# Patient Record
Sex: MALE | Race: WHITE | NOT HISPANIC OR LATINO | Employment: FULL TIME | URBAN - METROPOLITAN AREA
[De-identification: names, ages, dates, MRNs, and addresses within clinical notes are randomized per-mention and may not be internally consistent; named-entity substitution may affect disease eponyms.]

---

## 2020-10-16 ENCOUNTER — OFFICE VISIT (OUTPATIENT)
Dept: FAMILY MEDICINE | Facility: CLINIC | Age: 45
End: 2020-10-16
Payer: COMMERCIAL

## 2020-10-16 VITALS
TEMPERATURE: 97 F | SYSTOLIC BLOOD PRESSURE: 148 MMHG | HEART RATE: 92 BPM | DIASTOLIC BLOOD PRESSURE: 100 MMHG | OXYGEN SATURATION: 97 % | BODY MASS INDEX: 39.17 KG/M2 | HEIGHT: 75 IN | WEIGHT: 315 LBS

## 2020-10-16 DIAGNOSIS — R53.83 FATIGUE, UNSPECIFIED TYPE: ICD-10-CM

## 2020-10-16 DIAGNOSIS — I10 ESSENTIAL HYPERTENSION: Primary | ICD-10-CM

## 2020-10-16 DIAGNOSIS — Z13.220 SCREENING, LIPID: ICD-10-CM

## 2020-10-16 DIAGNOSIS — Z11.4 SCREENING FOR HIV (HUMAN IMMUNODEFICIENCY VIRUS): ICD-10-CM

## 2020-10-16 DIAGNOSIS — K42.9 UMBILICAL HERNIA WITHOUT OBSTRUCTION AND WITHOUT GANGRENE: ICD-10-CM

## 2020-10-16 DIAGNOSIS — R79.89 LOW TESTOSTERONE: ICD-10-CM

## 2020-10-16 DIAGNOSIS — Z82.49 FAMILY HISTORY OF EARLY CAD: ICD-10-CM

## 2020-10-16 DIAGNOSIS — Z11.59 ENCOUNTER FOR HEPATITIS C SCREENING TEST FOR LOW RISK PATIENT: ICD-10-CM

## 2020-10-16 DIAGNOSIS — R60.9 DEPENDENT EDEMA: ICD-10-CM

## 2020-10-16 DIAGNOSIS — Z87.19 HISTORY OF UMBILICAL HERNIA: ICD-10-CM

## 2020-10-16 PROCEDURE — 3008F BODY MASS INDEX DOCD: CPT | Mod: S$GLB,,, | Performed by: NURSE PRACTITIONER

## 2020-10-16 PROCEDURE — 99204 OFFICE O/P NEW MOD 45 MIN: CPT | Mod: S$GLB,,, | Performed by: NURSE PRACTITIONER

## 2020-10-16 PROCEDURE — 3008F PR BODY MASS INDEX (BMI) DOCUMENTED: ICD-10-PCS | Mod: S$GLB,,, | Performed by: NURSE PRACTITIONER

## 2020-10-16 PROCEDURE — 99204 PR OFFICE/OUTPT VISIT, NEW, LEVL IV, 45-59 MIN: ICD-10-PCS | Mod: S$GLB,,, | Performed by: NURSE PRACTITIONER

## 2020-10-16 RX ORDER — LISINOPRIL AND HYDROCHLOROTHIAZIDE 10; 12.5 MG/1; MG/1
1 TABLET ORAL DAILY
Qty: 30 TABLET | Refills: 1 | Status: SHIPPED | OUTPATIENT
Start: 2020-10-16 | End: 2020-12-21 | Stop reason: SDUPTHER

## 2020-10-16 NOTE — PATIENT INSTRUCTIONS
Potassium-Rich Foods  The normal adult diet usually contains 2,000 mg to 4,000 mg of potassium per day. More potassium is needed when you lose too much potassium from your body. This can happen if you have diarrhea or vomiting. It can also happen if you take a medicine to make you urinate more (diuretic). To increase the amount of potassium in your diet, include these high-potassium foods.     [The (*) indicates foods highest in potassium.]  Vegetables  Artichokes. Cooked 1/2 cup, 200 mg to 300 mg*  Asparagus. Cooked 1/2 cup, 200 mg to 300 mg  Beans. White, red, almonte cooked 1/2 cup, 300 mg to 500 mg*  Beets. Cooked 1/2 cup, 200 mg to 300 mg  Broccoli. Cooked or raw 1 cup, 200 mg to 500 mg*  Almira sprouts. Cooked 1/2 cup, 200 mg to 300 mg  Cabbage. Raw 1 cup, 100 mg to 200 mg  Carrots. Raw or cooked 1/2 cup, 100 mg to 200 mg  Celery. Raw 1 cup, 200 mg to 300 mg  Lima beans. Fresh or frozen 1/2 cup, 300 mg to 500 mg*   Mushrooms. Raw or cooked 1/2 cup, 100 mg to 300 mg  Peas. Cooked 1/2 cup, 150 mg to 250 mg   Potatoes. Baked 1 medium, 500 mg to 900 mg*   Spinach. Cooked 1 cup, 800 mg to 900 mg*   Spinach. Raw 2 cups, 300 mg to 400 mg *  Squash, winter. Fresh, frozen, or cooked 1/2 cup, 200 mg to 400 mg   Tomato. Fresh 1 medium, 200 mg to 300 mg   Tomato juice. Canned 1/2 cup, 200 mg to 300 mg   Fruits  Apple juice. Unsweetened 1 cup, 200 mg to 300 mg   Apricots. Canned 1/2 cup, 200 mg to 300 mg   Apricots. Dried 4 pieces, 100 mg to 200 mg   Avocado. Raw 1/2 cup, 300 mg to 400 mg*  Banana. Fresh 1 small, 300 mg to 400 mg*   Cantaloupe. Fresh 1 cup diced, 300 mg to 400 mg*   Grape juice. Unsweetened 1 cup, 200 mg to 300 mg   Honeydew melon. Fresh 1 cup diced, 300 mg to 400 mg*   Orange. Fresh 1 medium, 200 mg to 300 mg    Orange juice. Unsweetened, fresh or frozen 1/2 cup, 200 mg to 300 mg  Pineapple juice. Unsweetened 1 cup, 300 mg to 400 mg   Prune juice. Unsweetened 1/2 cup, 300 mg to 400 mg*   Prunes. Dried 5  pieces, 300 mg to 400 mg*   Strawberries. Fresh or frozen 1 cup, 200 mg to 300 mg  Meat  Red meat. Cooked 3 ounces, 100 mg to 300 mg   Seafood  Cod, flounder, halibut. Cooked 3 ounces, 100 mg to 300 mg*  Anselmo. Cooked, 3 ounces 300 mg to 400 mg*   Scallops. Cooked 3 ounces, 200 mg to 300 mg*  Shrimp. Cooked 3/4 cup, 100 mg to 200 mg   Tuna. Fresh or canned 3/4 cup, 200 mg to 500 mg   Date Last Reviewed: 10/1/2016  © 9356-0633 [employer]. [address] [place] [address], [place] [address]. All rights reserved. This information is not intended as a substitute for professional medical care. Always follow your healthcare professional's instructions.        Low-Salt Diet  This diet removes foods that are high in salt. It also limits the amount of salt you use when cooking. It is most often used for people with high blood pressure, edema (fluid retention), and kidney, liver, or heart disease.  Table salt contains the mineral sodium. Your body needs sodium to work normally. But too much sodium can make your health problems worse. Your healthcare provider is recommending a low-salt (also called low-sodium) diet for you. Your total daily allowance of salt is 1,500 to 2,300 milligrams (mg). It is less than 1 teaspoon of table salt. This means you can have only about 500 to 700 mg of sodium at each meal. People with certain health problems should limit salt intake to the lower end of the recommended range.    When you cook, dont add much salt. If you can cook without using salt, even better. Dont add salt to your food at the table.  When shopping, read food labels. Salt is often called sodium on the label. Choose foods that are salt-free, low salt, or very low salt. Note that foods with reduced salt may not lower your salt intake enough.    Beans, potatoes, and pasta  Ok: Dry beans, split peas, lentils, potatoes, rice, macaroni, pasta, spaghetti without added salt  Avoid: Potato chips, tortilla chips, and similar  products  Breads and cereals  Ok: Low-sodium breads, rolls, cereals, and cakes; low-salt crackers, matzo crackers  Avoid: Salted crackers, pretzels, popcorn, Austrian toast, pancakes, muffins  Dairy  Ok: Milk, chocolate milk, hot chocolate mix, low-salt cheeses, and yogurt  Avoid: Processed cheese and cheese spreads; Roquefort, Camembert, and cottage cheese; buttermilk, instant breakfast drink  Desserts  Ok: Ice cream, frozen yogurt, juice bars, gelatin, cookies and pies, sugar, honey, jelly, hard candy  Avoid: Most pies, cakes and cookies prepared or processed with salt; instant pudding  Drinks  Ok: Tea, coffee, fizzy (carbonated) drinks, juices  Avoid: Flavored coffees, electrolyte replacement drinks, sports drinks  Meats  Ok: All fresh meat, fish, poultry, low-salt tuna, eggs, egg substitute  Avoid: Smoked, pickled, brine-cured, or salted meats and fish. This includes hopkins, chipped beef, corned beef, hot dogs, deli meats, ham, kosher meats, salt pork, sausage, canned tuna, salted codfish, smoked salmon, herring, sardines, or anchovies.  Seasonings and spices  Ok: Most seasonings are okay. Good substitutes for salt include: fresh herb blends, hot sauce, lemon, garlic, calderon, vinegar, dry mustard, parsley, cilantro, horseradish, tomato paste, regular margarine, mayonnaise, unsalted butter, cream cheese, vegetable oil, cream, low-salt salad dressing and gravy.  Avoid: Regular ketchup, relishes, pickles, soy sauce, teriyaki sauce, Worcestershire sauce, BBQ sauce, tartar sauce, meat tenderizer, chili sauce, regular gravy, regular salad dressing, salted butter  Soups  Ok: Low-salt soups and broths made with allowed foods  Avoid: Bouillon cubes, soups with smoked or salted meats, regular soup and broth  Vegetables  Ok: Most vegetables are okay; also low-salt tomato and vegetable juices  Avoid: Sauerkraut and other brine-soaked vegetables; pickles and other pickled vegetables; tomato juice, olives  Date Last Reviewed:  8/1/2016  © 4437-3492 [employer]. [address] [place] [address], [place] [address]. All rights reserved. This information is not intended as a substitute for professional medical care. Always follow your healthcare professional's instructions.        Eating Heart-Healthy Foods  Eating has a big impact on your heart health. In fact, eating healthier can improve several of your heart risks at once. For instance, it helps you manage weight, cholesterol, and blood pressure. Here are ideas to help you make heart-healthy changes without giving up all the foods and flavors you love.  Getting started  · Talk with your health care provider about eating plans, such as the DASH or Mediterranean diet. You may also be referred to a dietitian.  · Change a few things at a time. Give yourself time to get used to a few eating changes before adding more.  · Work to create a tasty, healthy eating plan that you can stick to for the rest of your life.    Goals for healthy eating  Below are some tips to improve your eating habits:  · Limit saturated fats and trans fats. Saturated fats raise your levels of cholesterol, so keep these fats to a minimum. They are found in foods such as fatty meats, whole milk, cheese, and palm and coconut oils. Avoid trans fats because they lower good cholesterol as well as raise bad cholesterol. Trans fats are most often found in processed foods.  · Reduce sodium (salt) intake. Eating too much salt may increase your blood pressure. Limit your sodium intake to 2,300 milligrams (mg) per day, or less if your health care provider recommends it. Dining out less often and eating fewer processed foods are two great ways to decrease the amount of salt you consume.  · Managing calories. A calorie is a unit of energy. Your body burns calories for fuel, but if you eat more calories than your body burns, the extras are stored as fat. Your health care provider can help you create a diet plan to manage your  calories. This will likely include eating healthier foods as well as exercising regularly. To help you track your progress, keep a diary to record what you eat and how often you exercise.  Choose the right foods  Aim to make these foods staples of your diet. If you have diabetes, you may have different recommendations than what is listed here:  · Fruits and vegetable provide plenty of nutrients without a lot of calories. At meals, fill half your plate with these foods. Split the other half of your plate between whole grains and lean protein.  · Whole grains are high in fiber and rich in vitamins and nutrients. Good choices include whole-wheat bread, pasta, and brown rice.  · Lean proteins give you nutrition with less fat. Good choices include fish, skinless chicken, and beans.  · Low-fat or nonfat dairy provides nutrients without a lot of fat. Try low-fat or nonfat milk, cheese, or yogurt.  · Healthy fats can be good for you in small amounts. These are unsaturated fats, such as olive oil, nuts, and fish. Try to have at least 2 servings per week of fatty fish such as salmon, sardines, mackerel, rainbow trout, and albacore tuna. These contain omega-3 fatty acids, which are good for your heart. Flaxseed is another source of a heart-healthy fat.  More on heart healthy eating    Read food labels  Healthy eating starts at the grocery store. Be sure to pay attention to food labels on packaged foods. Look for products that are high in fiber and protein, and low in saturated fat, cholesterol, and sodium. Avoid products that contain trans fat. And pay close attention to serving size. For instance, if you plan to eat two servings, double all the numbers on the label.  Prepare food right  A key part of healthy cooking is cutting down on added fat and salt. Look on the internet for lower-fat, lower-sodium recipes. Also, try these tips:  · Remove fat from meat and skin from poultry before cooking.  · Skim fat from the surface of  soups and sauces.  · Broil, boil, bake, steam, grill, and microwave food without added fats.  · Choose ingredients that spice up your food without adding calories, fat, or sodium. Try these items: horseradish, hot sauce, lemon, mustard, nonfat salad dressings, and vinegar. For salt-free herbs and spices, try basil, cilantro, cinnamon, pepper, and rosemary.  Date Last Reviewed: 6/25/2015  © 0058-9544 [employer]. [address] [place] [address]. All rights reserved. This information is not intended as a substitute for professional medical care. Always follow your healthcare professional's instructions.      High Blood Pressure, New, Begin Treatment  Your blood pressure was high enough today to start treatment with medicines. Often health care providers dont know what causes high blood pressure (hypertension). But it can be controlled with lifestyle changes and medicines. High blood pressure usually has no symptoms. But it can sometimes cause headache, dizziness, blurred vision, a rushing sound in your ears, chest pain, or shortness of breath. But even without symptoms, high blood pressure thats not treated raises your risk for heart attack and stroke. High blood pressure is a serious health risk and shouldnt be ignored.    A blood pressure reading is made up of two numbers: a higher number over a lower number. The top number is the systolic pressure. The bottom number is the diastolic pressure. A normal blood pressure is a systolic pressure of less than 120 over a diastolic pressure less than 80. You will see your blood pressure readings written together. For example, a person with a systolic pressure of 118 and a diastolic pressure of 78 will have 118/78 written in the medical record.  High blood pressure is when either the top number is 140 or higher, or the bottom number is 90 or higher. High blood pressure is diagnosed when multiple, separate readings show blood pressures above 140/90.  The blood pressures between normal and high are called prehypertension.  Home care  If you have high blood pressure, you should do what is listed below to lower your blood pressure. If you are taking medicines for high blood pressure, these methods may reduce or end your need for medicines in the future.  · Begin a weight-loss program if you are overweight.  · Cut back on how much salt you get in your diet. Heres how to do this:  ¨ Dont eat foods that have a lot of salt. These include olives, pickles, smoked meats, and salted potato chips.  ¨ Dont add salt to your food at the table.  ¨ Use only small amounts of salt when cooking.  ¨ Review food labels to track how much salt is in prepared foods.  ¨ When eating out, ask that no additional salt be added to your food order.  · Begin an exercise program. Talk with your health care provider about the type of exercise program that would be best for you. It doesn't have to be hard. Even brisk walking for 20 minutes 3 times a week is a good form of exercise.  · Dont take medicines that have heart stimulants. This includes many over-the-counter cold and sinus decongestant pills and sprays, as well as diet pills. Check the warnings about hypertension on the label. Before purchasing any over-the-counter medicines or supplements, always ask the pharmacist about the product's potential interaction with your high blood pressure and your high blood pressure medicines.  · Stimulants such as amphetamine or cocaine could be lethal for someone with high blood pressure. Never take these.  · Limit how much caffeine you get in your diet. Switch to caffeine-free products.  · Stop smoking. If you are a long-time smoker, this can be hard. Enroll in a stop-smoking program to make it more likely that you will quit for good.  · Learn how to handle stress. This is an important part of any program to lower blood pressure. Learn about relaxation methods like meditation, yoga, or  biofeedback.  · If your provider prescribed medicines, take them exactly as directed. Missing doses may cause your blood pressure get out of control.  · If you miss a dose or doses, check with your healthcare provider or pharmacist about what to do.  · Consider buying an automatic blood pressure machine. Your provider can make a recommendation. You can get one of these at most pharmacies.  The American Heart Association recommends the following guidelines for home blood pressure monitoring:  · Don't smoke or drink coffee for 30 minutes before taking your blood pressure.  · Go to the bathroom before the test.  · Relax for 5 minutes before taking the measurement.  · Sit with your back supported (don't sit on a couch or soft chair); keep your feet on the floor uncrossed. Place your arm on a solid flat surface (like a table) with the upper part of the arm at heart level. Place the middle of the cuff directly above the eye of the elbow. Check the monitor's instruction manual for an illustration.  · Take multiple readings. When you measure, take 2 to 3 readings one minute apart and record all of the results.  · Take your blood pressure at the same time every day, or as your healthcare provider recommends.  · Record the date, time, and blood pressure reading.  · Take the record with you to your next medical appointment. If your blood pressure monitor has a built-in memory, simply take the monitor with you to your next appointment.  · Call your provider if you have several high readings. Don't be frightened by a single high blood pressure reading, but if you get several high readings, check in with your healthcare provider.  · Note: When blood pressure reaches a systolic (top number) of 180 or higher OR diastolic (bottom number) of 110 or higher, seek emergency medical treatment.  Follow-up care  Because a new blood pressure medicine was started today, its important that you have your blood pressure rechecked. This is to  make sure that the medicine is working and that you have no serious side effects. Keep all your follow up appointments. Write down medicine and blood pressure questions and bring them to your next appointment. If you have pressing concerns about your new medicine or your blood pressure, call your provider. Unless told otherwise, follow up with your health care provider or this facility within the next 3 days.  When to seek medical advice  Call your healthcare provider right away if any of these occur:  · Blood pressure reaches a systolic (top number) of 180 or higher, OR diastolic (bottom number) of 110 or higher, seek emergency medical treatment.  · Chest pain or shortness of breath  · Severe headache  · Throbbing or rushing sound in the ears  · Nosebleed  · Sudden severe pain in your belly (abdomen)  · Extreme drowsiness, confusion, or fainting  · Dizziness or dizziness with a spinning sensation (vertigo)  · Weakness of an arm or leg or one side of the face  · You have problems speaking or seeing   Date Last Reviewed: 12/1/2016  © 0054-8269 [employer]. [address] [place] [address]. All rights reserved. This information is not intended as a substitute for professional medical care. Always follow your healthcare professional's instructions.        Low-Salt Diet  This diet removes foods that are high in salt. It also limits the amount of salt you use when cooking. It is most often used for people with high blood pressure, edema (fluid retention), and kidney, liver, or heart disease.  Table salt contains the mineral sodium. Your body needs sodium to work normally. But too much sodium can make your health problems worse. Your healthcare provider is recommending a low-salt (also called low-sodium) diet for you. Your total daily allowance of salt is 1,500 to 2,300 milligrams (mg). It is less than 1 teaspoon of table salt. This means you can have only about 500 to 700 mg of sodium at each  meal. People with certain health problems should limit salt intake to the lower end of the recommended range.    When you cook, dont add much salt. If you can cook without using salt, even better. Dont add salt to your food at the table.  When shopping, read food labels. Salt is often called sodium on the label. Choose foods that are salt-free, low salt, or very low salt. Note that foods with reduced salt may not lower your salt intake enough.    Beans, potatoes, and pasta  Ok: Dry beans, split peas, lentils, potatoes, rice, macaroni, pasta, spaghetti without added salt  Avoid: Potato chips, tortilla chips, and similar products  Breads and cereals  Ok: Low-sodium breads, rolls, cereals, and cakes; low-salt crackers, matzo crackers  Avoid: Salted crackers, pretzels, popcorn, Tristanian toast, pancakes, muffins  Dairy  Ok: Milk, chocolate milk, hot chocolate mix, low-salt cheeses, and yogurt  Avoid: Processed cheese and cheese spreads; Roquefort, Camembert, and cottage cheese; buttermilk, instant breakfast drink  Desserts  Ok: Ice cream, frozen yogurt, juice bars, gelatin, cookies and pies, sugar, honey, jelly, hard candy  Avoid: Most pies, cakes and cookies prepared or processed with salt; instant pudding  Drinks  Ok: Tea, coffee, fizzy (carbonated) drinks, juices  Avoid: Flavored coffees, electrolyte replacement drinks, sports drinks  Meats  Ok: All fresh meat, fish, poultry, low-salt tuna, eggs, egg substitute  Avoid: Smoked, pickled, brine-cured, or salted meats and fish. This includes hopkins, chipped beef, corned beef, hot dogs, deli meats, ham, kosher meats, salt pork, sausage, canned tuna, salted codfish, smoked salmon, herring, sardines, or anchovies.  Seasonings and spices  Ok: Most seasonings are okay. Good substitutes for salt include: fresh herb blends, hot sauce, lemon, garlic, calderon, vinegar, dry mustard, parsley, cilantro, horseradish, tomato paste, regular margarine, mayonnaise, unsalted butter, cream  cheese, vegetable oil, cream, low-salt salad dressing and gravy.  Avoid: Regular ketchup, relishes, pickles, soy sauce, teriyaki sauce, Worcestershire sauce, BBQ sauce, tartar sauce, meat tenderizer, chili sauce, regular gravy, regular salad dressing, salted butter  Soups  Ok: Low-salt soups and broths made with allowed foods  Avoid: Bouillon cubes, soups with smoked or salted meats, regular soup and broth  Vegetables  Ok: Most vegetables are okay; also low-salt tomato and vegetable juices  Avoid: Sauerkraut and other brine-soaked vegetables; pickles and other pickled vegetables; tomato juice, olives  Date Last Reviewed: 8/1/2016  © 4986-7036 "[employer]. [address] [place] [address]. All rights reserved. This information is not intended as a substitute for professional medical care. Always follow your healthcare professional's instructions.

## 2020-10-16 NOTE — PROGRESS NOTES
SUBJECTIVE:    Patient ID: Joo Roman is a 44 y.o. male.    Chief Complaint: Establish Care, Annual Exam, and Hypertension    Mr. Roman is a 44-year-old gentleman here today to establish as a new patient.  He has no significant medical history.  Surgical history significant for umbilical hernia repair x3 with recurrence, as well as sinuplasty with T&A, tendon repair of the L ankle and R bicep repair. .  He has been told historically that his blood pressure was elevated.  No previous treatment.  Of note, his father had MI in his 50s, unsure if this required intervention.    Went to Urgent Care last week for concern of Covid symptoms. Had congestion, low-grade fever and abdominal pressure. D/t these complaints also had ECG and CXR which was reportedly normal.  Reports BP 160s/100? Occasionally has blurred vision and HA. 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 While driving last week he sneezed and felt dizzy, was very transient and self-resolved.     Denies any chest pain, palpitations or dyspnea.  Denies dyspepsia, cough, hemoptysis or melena.  Occasionally has dependent edema if he wear shorts mid calf socks.  For this reason he usually wears compression hose at work which helps with the swelling.  He is aware his weight is a contributing factor.    He does complain pressure and occasional stabbing pain at the site of umbilical hernia recurrence.  Worse with standing or lifting.  Approximately 3 cm diameter, protuberant when standing, reducible.      History reviewed. No pertinent past medical history.  Past Surgical History:   Procedure Laterality Date    ADENOIDECTOMY  2008    BALLOON SINUPLASTY OF PARANASAL SINUS  2008    BICEPS TENDON REPAIR Right 2015    Dr Garcia    HERNIA REPAIR      x3, 2000, mesh repair 2006? & 2008    TENDON REPAIR Left 2011    ankle, Dr Brown    TONSILLECTOMY  2008     Family History   Problem Relation Age of Onset    Thyroid disease  "Mother     Heart disease Father        Marital Status:   Alcohol History:  reports current alcohol use of about 1.0 - 2.0 standard drinks of alcohol per week.  Tobacco History:  reports that he has never smoked. He has never used smokeless tobacco.  Drug History:  reports no history of drug use.    Review of patient's allergies indicates:  No Known Allergies    Current Outpatient Medications:     lisinopriL-hydrochlorothiazide (PRINZIDE,ZESTORETIC) 10-12.5 mg per tablet, Take 1 tablet by mouth once daily., Disp: 30 tablet, Rfl: 1    Review of Systems   Constitutional: Negative for activity change, appetite change, chills, fatigue and fever.   HENT: Negative for congestion, ear pain, rhinorrhea and sore throat.    Eyes: Negative for redness and itching.   Respiratory: Negative for cough and shortness of breath.    Cardiovascular: Positive for leg swelling. Negative for chest pain.   Gastrointestinal: Positive for abdominal pain (discomfort s/t recurrent umbilical hernia). Negative for constipation, diarrhea and nausea.   Genitourinary: Negative for difficulty urinating and frequency.   Musculoskeletal: Negative for myalgias.   Neurological: Positive for headaches. Negative for dizziness.   Psychiatric/Behavioral: Negative for agitation, behavioral problems, sleep disturbance and suicidal ideas. The patient is not nervous/anxious.    All other systems reviewed and are negative.         Objective:      Vitals:    10/16/20 0923 10/16/20 1111   BP: (!) 154/92 (!) 148/100   Pulse: 92    Temp: 97.3 °F (36.3 °C)    SpO2: 97%    Weight: (!) 151.5 kg (334 lb)    Height: 6' 3" (1.905 m)      Body mass index is 41.75 kg/m².  Physical Exam  Vitals signs and nursing note reviewed.   Constitutional:       Appearance: Normal appearance. He is well-developed. He is obese.   HENT:      Head: Normocephalic.      Right Ear: Tympanic membrane, ear canal and external ear normal.      Left Ear: Tympanic membrane, ear canal and " external ear normal.   Eyes:      Extraocular Movements: Extraocular movements intact.      Conjunctiva/sclera: Conjunctivae normal.      Pupils: Pupils are equal, round, and reactive to light.   Neck:      Musculoskeletal: Normal range of motion and neck supple.      Vascular: No carotid bruit.   Cardiovascular:      Rate and Rhythm: Normal rate and regular rhythm.      Pulses: Normal pulses.      Heart sounds: Normal heart sounds.   Pulmonary:      Effort: Pulmonary effort is normal.      Breath sounds: Normal breath sounds.   Abdominal:      General: Bowel sounds are normal.      Palpations: Abdomen is soft.      Tenderness: There is no abdominal tenderness.      Hernia: A hernia (umbilical, rey 3 cm diameter, protuberant when standing, reducible.  ) is present.      Comments: protuberant   Musculoskeletal: Normal range of motion.      Right lower leg: No edema.      Left lower leg: No edema.   Skin:     General: Skin is warm and dry.      Capillary Refill: Capillary refill takes less than 2 seconds.   Neurological:      Mental Status: He is alert and oriented to person, place, and time.   Psychiatric:         Thought Content: Thought content normal.           Assessment:       1. Essential hypertension    2. Umbilical hernia without obstruction and without gangrene    3. History of umbilical hernia    4. Fatigue, unspecified type    5. Low testosterone    6. Dependent edema    7. Screening, lipid    8. Family history of early CAD    9. Screening for HIV (human immunodeficiency virus)    10. Encounter for hepatitis C screening test for low risk patient         Plan:       Essential hypertension  -     lisinopriL-hydrochlorothiazide (PRINZIDE,ZESTORETIC) 10-12.5 mg per tablet; Take 1 tablet by mouth once daily.  Dispense: 30 tablet; Refill: 1  Advised low-sodium diet.  Keep well hydrated.  Asked to please keep home BP journal, checking occasionally in a.m. occasionally in p.m. and bring to follow-up appointment.   Discussed in detail risks of untreated hypertension and benefit of addition of Ace inhibitors and potential side effects or reactions, stop medication and notify our office immediately should this occur.   Discussed diet & exercise modification.     Umbilical hernia without obstruction and without gangrene  -     Ambulatory referral/consult to General Surgery; Future; Expected date: 10/23/2020    History of umbilical hernia  -     Ambulatory referral/consult to General Surgery; Future; Expected date: 10/23/2020    Fatigue, unspecified type  -     CBC auto differential; Future; Expected date: 10/16/2020  -     Comprehensive Metabolic Panel; Future; Expected date: 10/16/2020  -     TSH; Future; Expected date: 10/16/2020  -     T4, Free; Future; Expected date: 10/16/2020  -     Testosterone, Free; Future; Expected date: 10/16/2020  -     Testosterone; Future; Expected date: 10/17/2020    Low testosterone  -     Testosterone, Free; Future; Expected date: 10/16/2020  -     Testosterone; Future; Expected date: 10/17/2020    Dependent edema  -     lisinopriL-hydrochlorothiazide (PRINZIDE,ZESTORETIC) 10-12.5 mg per tablet; Take 1 tablet by mouth once daily.  Dispense: 30 tablet; Refill: 1    Screening, lipid  -     Lipid Panel; Future; Expected date: 10/16/2020    Family history of early CAD  -     Comprehensive Metabolic Panel; Future; Expected date: 10/16/2020  -     Lipid Panel; Future; Expected date: 10/16/2020    Screening for HIV (human immunodeficiency virus)  -     HIV 1/2 Ag/Ab (4th Gen); Future; Expected date: 10/16/2020    Encounter for hepatitis C screening test for low risk patient  -     Hepatitis C Antibody; Future; Expected date: 10/16/2020      Follow up in about 4 weeks (around 11/13/2020), or if symptoms worsen or fail to improve.

## 2020-12-21 DIAGNOSIS — I10 ESSENTIAL HYPERTENSION: ICD-10-CM

## 2020-12-21 DIAGNOSIS — R60.9 DEPENDENT EDEMA: ICD-10-CM

## 2020-12-22 RX ORDER — LISINOPRIL AND HYDROCHLOROTHIAZIDE 10; 12.5 MG/1; MG/1
1 TABLET ORAL DAILY
Qty: 30 TABLET | Refills: 0 | Status: SHIPPED | OUTPATIENT
Start: 2020-12-22 | End: 2021-01-14 | Stop reason: SDUPTHER

## 2021-01-13 ENCOUNTER — TELEPHONE (OUTPATIENT)
Dept: FAMILY MEDICINE | Facility: CLINIC | Age: 46
End: 2021-01-13

## 2021-01-14 ENCOUNTER — OFFICE VISIT (OUTPATIENT)
Dept: FAMILY MEDICINE | Facility: CLINIC | Age: 46
End: 2021-01-14
Payer: COMMERCIAL

## 2021-01-14 VITALS
OXYGEN SATURATION: 96 % | DIASTOLIC BLOOD PRESSURE: 78 MMHG | WEIGHT: 315 LBS | TEMPERATURE: 98 F | HEART RATE: 91 BPM | BODY MASS INDEX: 39.17 KG/M2 | SYSTOLIC BLOOD PRESSURE: 130 MMHG | HEIGHT: 75 IN

## 2021-01-14 DIAGNOSIS — R40.0 DAYTIME SOMNOLENCE: ICD-10-CM

## 2021-01-14 DIAGNOSIS — R79.89 LOW TESTOSTERONE: ICD-10-CM

## 2021-01-14 DIAGNOSIS — R06.83 SNORING: ICD-10-CM

## 2021-01-14 DIAGNOSIS — R60.9 DEPENDENT EDEMA: ICD-10-CM

## 2021-01-14 DIAGNOSIS — E78.5 BORDERLINE HYPERLIPIDEMIA: ICD-10-CM

## 2021-01-14 DIAGNOSIS — G47.33 OSA (OBSTRUCTIVE SLEEP APNEA): ICD-10-CM

## 2021-01-14 DIAGNOSIS — R53.83 FATIGUE, UNSPECIFIED TYPE: ICD-10-CM

## 2021-01-14 DIAGNOSIS — I10 ESSENTIAL HYPERTENSION: Primary | ICD-10-CM

## 2021-01-14 LAB
ALBUMIN SERPL-MCNC: 4.5 G/DL (ref 4–5)
ALBUMIN/GLOB SERPL: 1.9 {RATIO} (ref 1.2–2.2)
ALP SERPL-CCNC: 86 IU/L (ref 39–117)
ALT SERPL-CCNC: 21 IU/L (ref 0–44)
AST SERPL-CCNC: 15 IU/L (ref 0–40)
BASOPHILS # BLD AUTO: 0.1 X10E3/UL (ref 0–0.2)
BASOPHILS NFR BLD AUTO: 1 %
BILIRUB SERPL-MCNC: 0.5 MG/DL (ref 0–1.2)
BUN SERPL-MCNC: 12 MG/DL (ref 6–24)
BUN/CREAT SERPL: 11 (ref 9–20)
CALCIUM SERPL-MCNC: 9.3 MG/DL (ref 8.7–10.2)
CHLORIDE SERPL-SCNC: 101 MMOL/L (ref 96–106)
CHOLEST SERPL-MCNC: 181 MG/DL (ref 100–199)
CO2 SERPL-SCNC: 26 MMOL/L (ref 20–29)
CREAT SERPL-MCNC: 1.11 MG/DL (ref 0.76–1.27)
EOSINOPHIL # BLD AUTO: 0.2 X10E3/UL (ref 0–0.4)
EOSINOPHIL NFR BLD AUTO: 3 %
ERYTHROCYTE [DISTWIDTH] IN BLOOD BY AUTOMATED COUNT: 12.5 % (ref 11.6–15.4)
GLOBULIN SER CALC-MCNC: 2.4 G/DL (ref 1.5–4.5)
GLUCOSE SERPL-MCNC: 93 MG/DL (ref 65–99)
HCT VFR BLD AUTO: 45.7 % (ref 37.5–51)
HCV AB S/CO SERPL IA: <0.1 S/CO RATIO (ref 0–0.9)
HDLC SERPL-MCNC: 32 MG/DL
HGB BLD-MCNC: 15.5 G/DL (ref 13–17.7)
HIV 1+2 AB+HIV1 P24 AG SERPL QL IA: NON REACTIVE
IMM GRANULOCYTES # BLD AUTO: 0 X10E3/UL (ref 0–0.1)
IMM GRANULOCYTES NFR BLD AUTO: 0 %
LDLC SERPL CALC-MCNC: 130 MG/DL (ref 0–99)
LYMPHOCYTES # BLD AUTO: 2.1 X10E3/UL (ref 0.7–3.1)
LYMPHOCYTES NFR BLD AUTO: 31 %
MCH RBC QN AUTO: 30.7 PG (ref 26.6–33)
MCHC RBC AUTO-ENTMCNC: 33.9 G/DL (ref 31.5–35.7)
MCV RBC AUTO: 91 FL (ref 79–97)
MONOCYTES # BLD AUTO: 0.4 X10E3/UL (ref 0.1–0.9)
MONOCYTES NFR BLD AUTO: 6 %
NEUTROPHILS # BLD AUTO: 3.9 X10E3/UL (ref 1.4–7)
NEUTROPHILS NFR BLD AUTO: 59 %
PLATELET # BLD AUTO: 340 X10E3/UL (ref 150–450)
POTASSIUM SERPL-SCNC: 5.1 MMOL/L (ref 3.5–5.2)
PROT SERPL-MCNC: 6.9 G/DL (ref 6–8.5)
RBC # BLD AUTO: 5.05 X10E6/UL (ref 4.14–5.8)
SODIUM SERPL-SCNC: 138 MMOL/L (ref 134–144)
T4 FREE SERPL-MCNC: 1.32 NG/DL (ref 0.82–1.77)
TESTOST FREE SERPL-MCNC: 7.2 PG/ML (ref 6.8–21.5)
TESTOST SERPL-MCNC: 321 NG/DL (ref 264–916)
TRIGL SERPL-MCNC: 105 MG/DL (ref 0–149)
TSH SERPL DL<=0.005 MIU/L-ACNC: 1.69 UIU/ML (ref 0.45–4.5)
VLDLC SERPL CALC-MCNC: 19 MG/DL (ref 5–40)
WBC # BLD AUTO: 6.8 X10E3/UL (ref 3.4–10.8)

## 2021-01-14 PROCEDURE — 3008F BODY MASS INDEX DOCD: CPT | Mod: S$GLB,,, | Performed by: NURSE PRACTITIONER

## 2021-01-14 PROCEDURE — 3078F PR MOST RECENT DIASTOLIC BLOOD PRESSURE < 80 MM HG: ICD-10-PCS | Mod: S$GLB,,, | Performed by: NURSE PRACTITIONER

## 2021-01-14 PROCEDURE — 99214 OFFICE O/P EST MOD 30 MIN: CPT | Mod: S$GLB,,, | Performed by: NURSE PRACTITIONER

## 2021-01-14 PROCEDURE — 3078F DIAST BP <80 MM HG: CPT | Mod: S$GLB,,, | Performed by: NURSE PRACTITIONER

## 2021-01-14 PROCEDURE — 99214 PR OFFICE/OUTPT VISIT, EST, LEVL IV, 30-39 MIN: ICD-10-PCS | Mod: S$GLB,,, | Performed by: NURSE PRACTITIONER

## 2021-01-14 PROCEDURE — 3008F PR BODY MASS INDEX (BMI) DOCUMENTED: ICD-10-PCS | Mod: S$GLB,,, | Performed by: NURSE PRACTITIONER

## 2021-01-14 PROCEDURE — 3075F PR MOST RECENT SYSTOLIC BLOOD PRESS GE 130-139MM HG: ICD-10-PCS | Mod: S$GLB,,, | Performed by: NURSE PRACTITIONER

## 2021-01-14 PROCEDURE — 1126F AMNT PAIN NOTED NONE PRSNT: CPT | Mod: S$GLB,,, | Performed by: NURSE PRACTITIONER

## 2021-01-14 PROCEDURE — 3075F SYST BP GE 130 - 139MM HG: CPT | Mod: S$GLB,,, | Performed by: NURSE PRACTITIONER

## 2021-01-14 PROCEDURE — 1126F PR PAIN SEVERITY QUANTIFIED, NO PAIN PRESENT: ICD-10-PCS | Mod: S$GLB,,, | Performed by: NURSE PRACTITIONER

## 2021-01-14 RX ORDER — LISINOPRIL AND HYDROCHLOROTHIAZIDE 10; 12.5 MG/1; MG/1
1 TABLET ORAL DAILY
Qty: 90 TABLET | Refills: 1 | Status: SHIPPED | OUTPATIENT
Start: 2021-01-14 | End: 2022-03-18 | Stop reason: SDUPTHER

## 2021-01-18 PROBLEM — Z13.220 SCREENING, LIPID: Status: RESOLVED | Noted: 2020-10-16 | Resolved: 2021-01-18

## 2021-01-21 ENCOUNTER — TELEPHONE (OUTPATIENT)
Dept: UROLOGY | Facility: CLINIC | Age: 46
End: 2021-01-21

## 2021-08-13 ENCOUNTER — OCCUPATIONAL HEALTH (OUTPATIENT)
Dept: URGENT CARE | Facility: CLINIC | Age: 46
End: 2021-08-13

## 2021-08-13 DIAGNOSIS — Z20.822 ENCOUNTER FOR LABORATORY TESTING FOR COVID-19 VIRUS: Primary | ICD-10-CM

## 2021-08-13 LAB
CTP QC/QA: YES
SARS-COV-2 RDRP RESP QL NAA+PROBE: NEGATIVE

## 2021-08-13 PROCEDURE — 99199 PR COVID-19 OCC MED PROVIDER CONSULT: ICD-10-PCS | Mod: S$GLB,,, | Performed by: EMERGENCY MEDICINE

## 2021-08-13 PROCEDURE — 99199 UNLISTED SPECIAL SVC PX/RPRT: CPT | Mod: S$GLB,,, | Performed by: EMERGENCY MEDICINE

## 2021-08-13 PROCEDURE — U0002: ICD-10-PCS | Mod: QW,S$GLB,, | Performed by: NURSE PRACTITIONER

## 2021-08-13 PROCEDURE — U0002 COVID-19 LAB TEST NON-CDC: HCPCS | Mod: QW,S$GLB,, | Performed by: NURSE PRACTITIONER

## 2021-08-16 ENCOUNTER — OCCUPATIONAL HEALTH (OUTPATIENT)
Dept: URGENT CARE | Facility: CLINIC | Age: 46
End: 2021-08-16

## 2021-08-16 DIAGNOSIS — Z20.822 EXPOSURE TO COVID-19 VIRUS: Primary | ICD-10-CM

## 2021-08-16 LAB
CTP QC/QA: YES
SARS-COV-2 RDRP RESP QL NAA+PROBE: NEGATIVE

## 2021-08-16 PROCEDURE — U0002 COVID-19 LAB TEST NON-CDC: HCPCS | Mod: QW,S$GLB,, | Performed by: EMERGENCY MEDICINE

## 2021-08-16 PROCEDURE — U0002: ICD-10-PCS | Mod: QW,S$GLB,, | Performed by: EMERGENCY MEDICINE

## 2021-09-17 ENCOUNTER — OFFICE VISIT (OUTPATIENT)
Dept: URGENT CARE | Facility: CLINIC | Age: 46
End: 2021-09-17
Payer: COMMERCIAL

## 2021-09-17 VITALS
HEART RATE: 76 BPM | DIASTOLIC BLOOD PRESSURE: 101 MMHG | TEMPERATURE: 98 F | RESPIRATION RATE: 16 BRPM | OXYGEN SATURATION: 99 % | HEIGHT: 75 IN | WEIGHT: 241.38 LBS | SYSTOLIC BLOOD PRESSURE: 148 MMHG | BODY MASS INDEX: 30.01 KG/M2

## 2021-09-17 DIAGNOSIS — M25.522 LEFT ELBOW PAIN: ICD-10-CM

## 2021-09-17 DIAGNOSIS — M79.645 FINGER PAIN, LEFT: ICD-10-CM

## 2021-09-17 DIAGNOSIS — M79.644 FINGER PAIN, RIGHT: ICD-10-CM

## 2021-09-17 DIAGNOSIS — M25.512 ACUTE PAIN OF LEFT SHOULDER: Primary | ICD-10-CM

## 2021-09-17 PROCEDURE — 99204 OFFICE O/P NEW MOD 45 MIN: CPT | Mod: S$GLB,,, | Performed by: NURSE PRACTITIONER

## 2021-09-17 PROCEDURE — 73030 X-RAY EXAM OF SHOULDER: CPT | Mod: LT,S$GLB,, | Performed by: RADIOLOGY

## 2021-09-17 PROCEDURE — 73030 XR SHOULDER TRAUMA 3 VIEW LEFT: ICD-10-PCS | Mod: LT,S$GLB,, | Performed by: RADIOLOGY

## 2021-09-17 PROCEDURE — 73140 X-RAY EXAM OF FINGER(S): CPT | Mod: LT,S$GLB,, | Performed by: RADIOLOGY

## 2021-09-17 PROCEDURE — 99204 PR OFFICE/OUTPT VISIT, NEW, LEVL IV, 45-59 MIN: ICD-10-PCS | Mod: S$GLB,,, | Performed by: NURSE PRACTITIONER

## 2021-09-17 PROCEDURE — 73070 XR ELBOW 2 VIEWS LEFT: ICD-10-PCS | Mod: LT,S$GLB,, | Performed by: RADIOLOGY

## 2021-09-17 PROCEDURE — 73140 XR FINGER 2 OR MORE VIEWS LEFT: ICD-10-PCS | Mod: LT,S$GLB,, | Performed by: RADIOLOGY

## 2021-09-17 PROCEDURE — 73070 X-RAY EXAM OF ELBOW: CPT | Mod: LT,S$GLB,, | Performed by: RADIOLOGY

## 2021-09-17 PROCEDURE — 73140 X-RAY EXAM OF FINGER(S): CPT | Mod: RT,S$GLB,, | Performed by: RADIOLOGY

## 2021-09-17 RX ORDER — METHOCARBAMOL 750 MG/1
750 TABLET, FILM COATED ORAL EVERY 8 HOURS PRN
Qty: 21 TABLET | Refills: 0 | Status: SHIPPED | OUTPATIENT
Start: 2021-09-17 | End: 2023-04-28

## 2022-03-18 ENCOUNTER — OFFICE VISIT (OUTPATIENT)
Dept: FAMILY MEDICINE | Facility: CLINIC | Age: 47
End: 2022-03-18
Payer: COMMERCIAL

## 2022-03-18 VITALS
WEIGHT: 315 LBS | HEIGHT: 75 IN | HEART RATE: 78 BPM | OXYGEN SATURATION: 97 % | DIASTOLIC BLOOD PRESSURE: 96 MMHG | TEMPERATURE: 99 F | SYSTOLIC BLOOD PRESSURE: 144 MMHG | BODY MASS INDEX: 39.17 KG/M2

## 2022-03-18 DIAGNOSIS — E78.5 BORDERLINE HYPERLIPIDEMIA: ICD-10-CM

## 2022-03-18 DIAGNOSIS — I10 ESSENTIAL HYPERTENSION: Primary | ICD-10-CM

## 2022-03-18 DIAGNOSIS — R60.9 DEPENDENT EDEMA: ICD-10-CM

## 2022-03-18 DIAGNOSIS — Z13.29 SCREENING FOR THYROID DISORDER: ICD-10-CM

## 2022-03-18 PROCEDURE — 3008F BODY MASS INDEX DOCD: CPT | Mod: S$GLB,,, | Performed by: NURSE PRACTITIONER

## 2022-03-18 PROCEDURE — 3077F SYST BP >= 140 MM HG: CPT | Mod: S$GLB,,, | Performed by: NURSE PRACTITIONER

## 2022-03-18 PROCEDURE — 1160F PR REVIEW ALL MEDS BY PRESCRIBER/CLIN PHARMACIST DOCUMENTED: ICD-10-PCS | Mod: S$GLB,,, | Performed by: NURSE PRACTITIONER

## 2022-03-18 PROCEDURE — 3080F PR MOST RECENT DIASTOLIC BLOOD PRESSURE >= 90 MM HG: ICD-10-PCS | Mod: S$GLB,,, | Performed by: NURSE PRACTITIONER

## 2022-03-18 PROCEDURE — 99214 OFFICE O/P EST MOD 30 MIN: CPT | Mod: S$GLB,,, | Performed by: NURSE PRACTITIONER

## 2022-03-18 PROCEDURE — 3077F PR MOST RECENT SYSTOLIC BLOOD PRESSURE >= 140 MM HG: ICD-10-PCS | Mod: S$GLB,,, | Performed by: NURSE PRACTITIONER

## 2022-03-18 PROCEDURE — 3080F DIAST BP >= 90 MM HG: CPT | Mod: S$GLB,,, | Performed by: NURSE PRACTITIONER

## 2022-03-18 PROCEDURE — 99214 PR OFFICE/OUTPT VISIT, EST, LEVL IV, 30-39 MIN: ICD-10-PCS | Mod: S$GLB,,, | Performed by: NURSE PRACTITIONER

## 2022-03-18 PROCEDURE — 1160F RVW MEDS BY RX/DR IN RCRD: CPT | Mod: S$GLB,,, | Performed by: NURSE PRACTITIONER

## 2022-03-18 PROCEDURE — 3008F PR BODY MASS INDEX (BMI) DOCUMENTED: ICD-10-PCS | Mod: S$GLB,,, | Performed by: NURSE PRACTITIONER

## 2022-03-18 RX ORDER — LISINOPRIL AND HYDROCHLOROTHIAZIDE 10; 12.5 MG/1; MG/1
1 TABLET ORAL DAILY
Qty: 90 TABLET | Refills: 1 | Status: SHIPPED | OUTPATIENT
Start: 2022-03-18 | End: 2022-09-15

## 2022-03-18 NOTE — PROGRESS NOTES
SUBJECTIVE:      Patient ID: Joo Roman is a 46 y.o. male.    Chief Complaint: Hypertension (F/u, med refill)    46-year-old male presents to the clinic for hypertension follow-up. He is a former patient of [facility].  PMH significant for HTN. Surgical history significant for umbilical hernia repair x3 with recurrence, as well as sinuplasty with T&A for ROBER and then off CPAP, tendon repair of the L ankle and R bicep repair.   Of note, his father had MI in his 50s, unsure if this required intervention.    He is prescribed lisinopril-hydrochlorothiazide 10-12.5 mg. Blood press ure is uncontrolled.  He has been out of his medications for a while and never requested a refill. He felt like his blood pressure was elevated last week after taking Naproxen, Afrin, and NyQuil.  His weight is gradually increasing.  He is aware his weight is a contributing factor.  He does get mild dependent edema and for this he wears compression socks and takes frequent breaks to ambulate around the office.  Last labs were over a year ago. Cholesterol is borderline.  Denies chest pain, SOB, headaches.     Hypertension  This is a chronic problem. The current episode started more than 1 month ago. The problem has been gradually worsening since onset. The problem is uncontrolled. Associated symptoms include peripheral edema. Pertinent negatives include no anxiety, blurred vision, chest pain, headaches, malaise/fatigue, neck pain, orthopnea, palpitations, PND, shortness of breath or sweats. There are no associated agents to hypertension. Risk factors for coronary artery disease include family history, male gender, obesity, sedentary lifestyle and stress. Past treatments include ACE inhibitors and diuretics. The current treatment provides significant improvement. Compliance problems include exercise and diet.  There is no history of angina. Identifiable causes of hypertension include sleep apnea.       Family History   Problem  Relation Age of Onset    Thyroid disease Mother     Heart disease Father       Social History     Socioeconomic History    Marital status:    Occupational History    Occupation:      Comment: [employer]   Tobacco Use    Smoking status: Never Smoker    Smokeless tobacco: Never Used   Substance and Sexual Activity    Alcohol use: Yes     Alcohol/week: 1.0 - 2.0 standard drink     Types: 1 - 2 Shots of liquor per week    Drug use: Never    Sexual activity: Yes     Partners: Female     Birth control/protection: None     Current Outpatient Medications   Medication Sig Dispense Refill    lisinopriL-hydrochlorothiazide (PRINZIDE,ZESTORETIC) 10-12.5 mg per tablet Take 1 tablet by mouth once daily. 90 tablet 1    methocarbamoL (ROBAXIN) 750 MG Tab Take 1 tablet (750 mg total) by mouth every 8 (eight) hours as needed (muscle spasm). (Patient not taking: Reported on 3/18/2022) 21 tablet 0     No current facility-administered medications for this visit.     Review of patient's allergies indicates:  No Known Allergies   History reviewed. No pertinent past medical history.  Past Surgical History:   Procedure Laterality Date    ADENOIDECTOMY  2008    BALLOON SINUPLASTY OF PARANASAL SINUS  2008    BICEPS TENDON REPAIR Right 2015    Dr Garcia    HERNIA REPAIR      x3, 2000, mesh repair 2006? & 2008    TENDON REPAIR Left 2011    ankle, Dr Brown    TONSILLECTOMY  2008       Review of Systems   Constitutional: Negative for activity change, appetite change, chills, diaphoresis, fatigue, fever, malaise/fatigue and unexpected weight change.   HENT: Negative for congestion, ear pain, sinus pressure, sore throat, trouble swallowing and voice change.    Eyes: Negative for blurred vision, pain, discharge and visual disturbance.   Respiratory: Negative for cough, chest tightness, shortness of breath and wheezing.    Cardiovascular: Positive for leg swelling (dependent edema). Negative for chest  "pain, palpitations, orthopnea and PND.        Hypertension    Gastrointestinal: Negative for abdominal pain, constipation, diarrhea, nausea and vomiting.   Genitourinary: Negative for difficulty urinating, flank pain, frequency and urgency.   Musculoskeletal: Negative for back pain, joint swelling and neck pain.   Skin: Negative for color change and rash.   Neurological: Negative for dizziness, seizures, syncope, weakness, numbness and headaches.   Hematological: Negative for adenopathy.   Psychiatric/Behavioral: Negative for dysphoric mood and sleep disturbance. The patient is not nervous/anxious.       OBJECTIVE:      Vitals:    03/18/22 0842   BP: (!) 144/96   BP Location: Left arm   Patient Position: Sitting   BP Method: Large (Manual)   Pulse: 78   Temp: 98.5 °F (36.9 °C)   SpO2: 97%   Weight: (!) 155.4 kg (342 lb 11.2 oz)   Height: 6' 3" (1.905 m)     Physical Exam  Vitals and nursing note reviewed.   Constitutional:       General: He is awake. He is not in acute distress.     Appearance: Normal appearance. He is morbidly obese. He is not ill-appearing, toxic-appearing or diaphoretic.   HENT:      Head: Normocephalic and atraumatic.      Nose: Nose normal.   Eyes:      General: Lids are normal. Gaze aligned appropriately.      Conjunctiva/sclera: Conjunctivae normal.      Right eye: Right conjunctiva is not injected.      Left eye: Left conjunctiva is not injected.      Pupils: Pupils are equal, round, and reactive to light.   Cardiovascular:      Rate and Rhythm: Normal rate and regular rhythm.      Pulses: Normal pulses.      Heart sounds: Normal heart sounds, S1 normal and S2 normal. No murmur heard.    No friction rub. No gallop.   Pulmonary:      Effort: Pulmonary effort is normal. No respiratory distress.      Breath sounds: Normal breath sounds. No stridor. No decreased breath sounds, wheezing, rhonchi or rales.   Chest:      Chest wall: No tenderness.   Musculoskeletal:      Cervical back: Neck supple. "      Right lower leg: No edema.      Left lower leg: No edema.   Lymphadenopathy:      Cervical: No cervical adenopathy.   Skin:     General: Skin is warm and dry.      Capillary Refill: Capillary refill takes less than 2 seconds.      Findings: No erythema or rash.   Neurological:      Mental Status: He is alert and oriented to person, place, and time. Mental status is at baseline.   Psychiatric:         Attention and Perception: Attention normal.         Mood and Affect: Mood normal.         Speech: Speech normal.         Behavior: Behavior normal. Behavior is cooperative.         Thought Content: Thought content normal.         Judgment: Judgment normal.        Assessment:       1. Essential hypertension    2. Borderline hyperlipidemia    3. Screening for thyroid disorder    4. Dependent edema        Plan:       Essential hypertension  Uncontrolled, restart BP meds.  Low sodium diet, exercise, and weight loss discussed. Will recheck BP in 1 month.  -     Comprehensive Metabolic Panel; Future; Expected date: 03/18/2022  -     Lipid Panel; Future; Expected date: 03/18/2022  -     TSH; Future; Expected date: 03/18/2022  -     Microalbumin/Creatinine Ratio, Urine; Future; Expected date: 03/18/2022  -     Urinalysis; Future; Expected date: 03/18/2022  -     lisinopriL-hydrochlorothiazide (PRINZIDE,ZESTORETIC) 10-12.5 mg per tablet; Take 1 tablet by mouth once daily.  Dispense: 90 tablet; Refill: 1    Borderline hyperlipidemia  Avoid fried fatty foods and decrease the amount of saturated fats.  A diet incorporated with fresh vegetables, fruits, legumes, nuts, whole grains, and fish is recommended.  Engaged in moderate physical activity such as brisk walking or biking.  -     Comprehensive Metabolic Panel; Future; Expected date: 03/18/2022  -     Lipid Panel; Future; Expected date: 03/18/2022  -     TSH; Future; Expected date: 03/18/2022    Screening for thyroid disorder  -     TSH; Future; Expected date:  03/18/2022    Dependent edema  Stable with compression socks. No edema noted today. Continue current treatment.   -     lisinopriL-hydrochlorothiazide (PRINZIDE,ZESTORETIC) 10-12.5 mg per tablet; Take 1 tablet by mouth once daily.  Dispense: 90 tablet; Refill: 1    This note was created using [employer] voice recognition software that occasionally misinterprets phrases or words.     Follow up in about 1 month (around 4/18/2022) for HTN.      3/18/2022 RICARDO Ruby, FNP

## 2022-06-13 ENCOUNTER — OFFICE VISIT (OUTPATIENT)
Dept: FAMILY MEDICINE | Facility: CLINIC | Age: 47
End: 2022-06-13
Payer: COMMERCIAL

## 2022-06-13 VITALS
SYSTOLIC BLOOD PRESSURE: 122 MMHG | WEIGHT: 315 LBS | BODY MASS INDEX: 39.17 KG/M2 | OXYGEN SATURATION: 96 % | HEART RATE: 96 BPM | HEIGHT: 75 IN | DIASTOLIC BLOOD PRESSURE: 84 MMHG | TEMPERATURE: 99 F

## 2022-06-13 DIAGNOSIS — J06.9 UPPER RESPIRATORY TRACT INFECTION, UNSPECIFIED TYPE: Primary | ICD-10-CM

## 2022-06-13 DIAGNOSIS — R05.9 COUGH: ICD-10-CM

## 2022-06-13 DIAGNOSIS — J02.9 SORE THROAT: ICD-10-CM

## 2022-06-13 LAB
CTP QC/QA: YES
CTP QC/QA: YES
POC MOLECULAR INFLUENZA A AGN: NEGATIVE
POC MOLECULAR INFLUENZA B AGN: NEGATIVE
SARS-COV-2 RDRP RESP QL NAA+PROBE: NEGATIVE

## 2022-06-13 PROCEDURE — 87502 INFLUENZA DNA AMP PROBE: CPT | Mod: QW,S$GLB,, | Performed by: NURSE PRACTITIONER

## 2022-06-13 PROCEDURE — 3008F PR BODY MASS INDEX (BMI) DOCUMENTED: ICD-10-PCS | Mod: CPTII,S$GLB,, | Performed by: NURSE PRACTITIONER

## 2022-06-13 PROCEDURE — 99214 PR OFFICE/OUTPT VISIT, EST, LEVL IV, 30-39 MIN: ICD-10-PCS | Mod: S$GLB,,, | Performed by: NURSE PRACTITIONER

## 2022-06-13 PROCEDURE — 4010F PR ACE/ARB THEARPY RXD/TAKEN: ICD-10-PCS | Mod: CPTII,S$GLB,, | Performed by: NURSE PRACTITIONER

## 2022-06-13 PROCEDURE — 1159F MED LIST DOCD IN RCRD: CPT | Mod: CPTII,S$GLB,, | Performed by: NURSE PRACTITIONER

## 2022-06-13 PROCEDURE — 99214 OFFICE O/P EST MOD 30 MIN: CPT | Mod: S$GLB,,, | Performed by: NURSE PRACTITIONER

## 2022-06-13 PROCEDURE — 4010F ACE/ARB THERAPY RXD/TAKEN: CPT | Mod: CPTII,S$GLB,, | Performed by: NURSE PRACTITIONER

## 2022-06-13 PROCEDURE — U0002: ICD-10-PCS | Mod: QW,S$GLB,, | Performed by: NURSE PRACTITIONER

## 2022-06-13 PROCEDURE — 87502 POCT INFLUENZA A/B MOLECULAR: ICD-10-PCS | Mod: QW,S$GLB,, | Performed by: NURSE PRACTITIONER

## 2022-06-13 PROCEDURE — 3008F BODY MASS INDEX DOCD: CPT | Mod: CPTII,S$GLB,, | Performed by: NURSE PRACTITIONER

## 2022-06-13 PROCEDURE — U0002 COVID-19 LAB TEST NON-CDC: HCPCS | Mod: QW,S$GLB,, | Performed by: NURSE PRACTITIONER

## 2022-06-13 PROCEDURE — 1159F PR MEDICATION LIST DOCUMENTED IN MEDICAL RECORD: ICD-10-PCS | Mod: CPTII,S$GLB,, | Performed by: NURSE PRACTITIONER

## 2022-06-13 RX ORDER — PREDNISONE 20 MG/1
40 TABLET ORAL DAILY
Qty: 6 TABLET | Refills: 0 | Status: SHIPPED | OUTPATIENT
Start: 2022-06-13 | End: 2022-06-16

## 2022-06-13 RX ORDER — DOXYCYCLINE HYCLATE 100 MG
100 TABLET ORAL 2 TIMES DAILY
Qty: 14 TABLET | Refills: 0 | Status: SHIPPED | OUTPATIENT
Start: 2022-06-13 | End: 2022-06-20

## 2022-06-13 RX ORDER — PROMETHAZINE HYDROCHLORIDE AND DEXTROMETHORPHAN HYDROBROMIDE 6.25; 15 MG/5ML; MG/5ML
5 SYRUP ORAL EVERY 6 HOURS PRN
Qty: 180 ML | Refills: 0 | Status: SHIPPED | OUTPATIENT
Start: 2022-06-13 | End: 2022-06-23

## 2022-06-13 NOTE — PROGRESS NOTES
SUBJECTIVE:      Patient ID: Joo Roman is a 46 y.o. male.    Chief Complaint: Cough (X 6 days. 2 negative covid home test.) and head congestion    46-year-old male presents to the clinic with complaints a cough, sore throat, and head congestion.  Symptoms started 6 days ago.  Patient reports to negative COVID-19 home test. The cough is non productive, but is causing irrigation to his throat. He does have some sinus congetion and mild pressure that started today. He has been taking otc cold and flu medication without relief.  Denies fever, chest pain, SOB.        Family History   Problem Relation Age of Onset    Thyroid disease Mother     Heart disease Father       Social History     Socioeconomic History    Marital status:    Occupational History    Occupation:      Comment: [employer]   Tobacco Use    Smoking status: Never Smoker    Smokeless tobacco: Never Used   Substance and Sexual Activity    Alcohol use: Yes     Alcohol/week: 1.0 - 2.0 standard drink     Types: 1 - 2 Shots of liquor per week    Drug use: Never    Sexual activity: Yes     Partners: Female     Birth control/protection: None     Current Outpatient Medications   Medication Sig Dispense Refill    lisinopriL-hydrochlorothiazide (PRINZIDE,ZESTORETIC) 10-12.5 mg per tablet Take 1 tablet by mouth once daily. 90 tablet 1    doxycycline (VIBRA-TABS) 100 MG tablet Take 1 tablet (100 mg total) by mouth 2 (two) times daily. for 7 days 14 tablet 0    methocarbamoL (ROBAXIN) 750 MG Tab Take 1 tablet (750 mg total) by mouth every 8 (eight) hours as needed (muscle spasm). (Patient not taking: Reported on 6/13/2022) 21 tablet 0    predniSONE (DELTASONE) 20 MG tablet Take 2 tablets (40 mg total) by mouth once daily. for 3 days 6 tablet 0    promethazine-dextromethorphan (PROMETHAZINE-DM) 6.25-15 mg/5 mL Syrp Take 5 mLs by mouth every 6 (six) hours as needed (cough). 180 mL 0     No current facility-administered  "medications for this visit.     Review of patient's allergies indicates:  No Known Allergies   History reviewed. No pertinent past medical history.  Past Surgical History:   Procedure Laterality Date    ADENOIDECTOMY  2008    BALLOON SINUPLASTY OF PARANASAL SINUS  2008    BICEPS TENDON REPAIR Right 2015    Dr Garcia    HERNIA REPAIR      x3, 2000, mesh repair 2006? & 2008    TENDON REPAIR Left 2011    ankle, Dr Brown    TONSILLECTOMY  2008       Review of Systems   Constitutional: Negative for activity change, appetite change, chills, diaphoresis, fatigue, fever and unexpected weight change.   HENT: Positive for congestion, postnasal drip, sinus pressure and sore throat. Negative for ear pain, trouble swallowing and voice change.    Eyes: Negative for pain, discharge and visual disturbance.   Respiratory: Positive for cough. Negative for chest tightness, shortness of breath and wheezing.    Cardiovascular: Negative for chest pain and palpitations.   Gastrointestinal: Negative for abdominal pain, constipation, diarrhea, nausea and vomiting.   Genitourinary: Negative for difficulty urinating, flank pain, frequency and urgency.   Musculoskeletal: Negative for back pain and joint swelling.   Skin: Negative for color change and rash.   Neurological: Negative for dizziness, seizures, syncope, weakness, numbness and headaches.   Hematological: Negative for adenopathy.   Psychiatric/Behavioral: Negative for dysphoric mood and sleep disturbance. The patient is not nervous/anxious.       OBJECTIVE:      Vitals:    06/13/22 1024   BP: 122/84   BP Location: Left arm   Patient Position: Sitting   BP Method: Large (Manual)   Pulse: 96   Temp: 98.8 °F (37.1 °C)   TempSrc: Oral   SpO2: 96%   Weight: (!) 152.9 kg (337 lb 1.6 oz)   Height: 6' 3" (1.905 m)     Physical Exam  Vitals and nursing note reviewed.   Constitutional:       General: He is awake. He is not in acute distress.     Appearance: Normal appearance. He is " morbidly obese. He is not ill-appearing, toxic-appearing or diaphoretic.   HENT:      Head: Normocephalic and atraumatic.      Right Ear: Tympanic membrane, ear canal and external ear normal.      Left Ear: Tympanic membrane, ear canal and external ear normal.      Nose: Congestion present.      Right Sinus: Maxillary sinus tenderness present. No frontal sinus tenderness.      Left Sinus: Maxillary sinus tenderness present. No frontal sinus tenderness.      Comments: Mild sinus tenderness     Mouth/Throat:      Lips: Pink.      Mouth: Mucous membranes are moist.      Pharynx: Oropharynx is clear. Uvula midline. Posterior oropharyngeal erythema present. No oropharyngeal exudate.   Eyes:      General: Lids are normal. Gaze aligned appropriately.      Conjunctiva/sclera: Conjunctivae normal.      Right eye: Right conjunctiva is not injected.      Left eye: Left conjunctiva is not injected.      Pupils: Pupils are equal, round, and reactive to light.   Cardiovascular:      Rate and Rhythm: Normal rate and regular rhythm.      Pulses: Normal pulses.      Heart sounds: Normal heart sounds, S1 normal and S2 normal. No murmur heard.    No friction rub. No gallop.   Pulmonary:      Effort: Pulmonary effort is normal. No respiratory distress.      Breath sounds: Normal breath sounds. No stridor. No decreased breath sounds, wheezing, rhonchi or rales.   Chest:      Chest wall: No tenderness.   Musculoskeletal:      Cervical back: Neck supple.      Right lower leg: No edema.      Left lower leg: No edema.   Lymphadenopathy:      Cervical: No cervical adenopathy.   Skin:     General: Skin is warm and dry.      Capillary Refill: Capillary refill takes less than 2 seconds.      Findings: No erythema or rash.   Neurological:      Mental Status: He is alert and oriented to person, place, and time. Mental status is at baseline.   Psychiatric:         Attention and Perception: Attention normal.         Mood and Affect: Mood normal.          Speech: Speech normal.         Behavior: Behavior normal. Behavior is cooperative.         Thought Content: Thought content normal.         Judgment: Judgment normal.         Office Visit on 06/13/2022   Component Date Value Ref Range Status    POC Rapid COVID 06/13/2022 Negative  Negative Final     Acceptable 06/13/2022 Yes   Final    POC Molecular Influenza A Ag 06/13/2022 Negative  Negative, Not Reported Final    POC Molecular Influenza B Ag 06/13/2022 Negative  Negative, Not Reported Final     Acceptable 06/13/2022 Yes   Final       Assessment:       1. Upper respiratory tract infection, unspecified type    2. Cough    3. Sore throat        Plan:       Upper respiratory tract infection, unspecified type  Start Doxycyline.  Advised to take OTC medications such as tylenol and motrin for fever, Mucinex DM or similar for cough, antihistamine and/or decongestant for nasal symptoms.  Monitor blood pressure if a decongestant is used.  Get plenty of rest and fluids to maintain hydration. Gargle with warm salt water if sore throat is present.   -     predniSONE (DELTASONE) 20 MG tablet; Take 2 tablets (40 mg total) by mouth once daily. for 3 days  Dispense: 6 tablet; Refill: 0  -     doxycycline (VIBRA-TABS) 100 MG tablet; Take 1 tablet (100 mg total) by mouth 2 (two) times daily. for 7 days  Dispense: 14 tablet; Refill: 0  -     promethazine-dextromethorphan (PROMETHAZINE-DM) 6.25-15 mg/5 mL Syrp; Take 5 mLs by mouth every 6 (six) hours as needed (cough).  Dispense: 180 mL; Refill: 0    Cough  Promethazine DM prn at night.  Discussed using Mucinex DM during the day.  A short course of steroids was provided.    -     POCT COVID-19 Rapid Screening  -     POCT Influenza A/B Molecular  -     predniSONE (DELTASONE) 20 MG tablet; Take 2 tablets (40 mg total) by mouth once daily. for 3 days  Dispense: 6 tablet; Refill: 0  -     doxycycline (VIBRA-TABS) 100 MG tablet; Take 1 tablet (100  mg total) by mouth 2 (two) times daily. for 7 days  Dispense: 14 tablet; Refill: 0  -     promethazine-dextromethorphan (PROMETHAZINE-DM) 6.25-15 mg/5 mL Syrp; Take 5 mLs by mouth every 6 (six) hours as needed (cough).  Dispense: 180 mL; Refill: 0    Sore throat  Salt water gargles, tea with honey, Cepacol, or cough drops.   -     POCT COVID-19 Rapid Screening  -     POCT Influenza A/B Molecular    This note was created using [employer] voice recognition software that occasionally misinterprets phrases or words.     Follow up if symptoms worsen or fail to improve.      6/13/2022 RICARDO Ruby, FNP

## 2022-06-15 ENCOUNTER — PATIENT MESSAGE (OUTPATIENT)
Dept: FAMILY MEDICINE | Facility: CLINIC | Age: 47
End: 2022-06-15

## 2022-09-15 DIAGNOSIS — I10 ESSENTIAL HYPERTENSION: ICD-10-CM

## 2022-09-15 DIAGNOSIS — R60.9 DEPENDENT EDEMA: ICD-10-CM

## 2022-09-15 RX ORDER — LISINOPRIL AND HYDROCHLOROTHIAZIDE 10; 12.5 MG/1; MG/1
TABLET ORAL
Qty: 90 TABLET | Refills: 0 | Status: SHIPPED | OUTPATIENT
Start: 2022-09-15 | End: 2022-12-21

## 2023-04-28 ENCOUNTER — OFFICE VISIT (OUTPATIENT)
Dept: FAMILY MEDICINE | Facility: CLINIC | Age: 48
End: 2023-04-28
Payer: COMMERCIAL

## 2023-04-28 VITALS
WEIGHT: 313.31 LBS | HEART RATE: 83 BPM | DIASTOLIC BLOOD PRESSURE: 86 MMHG | HEIGHT: 75 IN | OXYGEN SATURATION: 99 % | SYSTOLIC BLOOD PRESSURE: 126 MMHG | BODY MASS INDEX: 38.95 KG/M2 | TEMPERATURE: 98 F

## 2023-04-28 DIAGNOSIS — Z13.1 SCREENING FOR DIABETES MELLITUS: ICD-10-CM

## 2023-04-28 DIAGNOSIS — E66.01 CLASS 2 SEVERE OBESITY DUE TO EXCESS CALORIES WITH SERIOUS COMORBIDITY AND BODY MASS INDEX (BMI) OF 39.0 TO 39.9 IN ADULT: ICD-10-CM

## 2023-04-28 DIAGNOSIS — I10 ESSENTIAL HYPERTENSION: Primary | ICD-10-CM

## 2023-04-28 DIAGNOSIS — K42.9 UMBILICAL HERNIA WITHOUT OBSTRUCTION AND WITHOUT GANGRENE: ICD-10-CM

## 2023-04-28 DIAGNOSIS — F40.243 ANXIETY WITH FLYING: ICD-10-CM

## 2023-04-28 DIAGNOSIS — R60.9 DEPENDENT EDEMA: ICD-10-CM

## 2023-04-28 PROCEDURE — 1160F RVW MEDS BY RX/DR IN RCRD: CPT | Mod: CPTII,S$GLB,, | Performed by: NURSE PRACTITIONER

## 2023-04-28 PROCEDURE — 4010F ACE/ARB THERAPY RXD/TAKEN: CPT | Mod: CPTII,S$GLB,, | Performed by: NURSE PRACTITIONER

## 2023-04-28 PROCEDURE — 1160F PR REVIEW ALL MEDS BY PRESCRIBER/CLIN PHARMACIST DOCUMENTED: ICD-10-PCS | Mod: CPTII,S$GLB,, | Performed by: NURSE PRACTITIONER

## 2023-04-28 PROCEDURE — 1159F PR MEDICATION LIST DOCUMENTED IN MEDICAL RECORD: ICD-10-PCS | Mod: CPTII,S$GLB,, | Performed by: NURSE PRACTITIONER

## 2023-04-28 PROCEDURE — 99214 PR OFFICE/OUTPT VISIT, EST, LEVL IV, 30-39 MIN: ICD-10-PCS | Mod: S$GLB,,, | Performed by: NURSE PRACTITIONER

## 2023-04-28 PROCEDURE — 3079F PR MOST RECENT DIASTOLIC BLOOD PRESSURE 80-89 MM HG: ICD-10-PCS | Mod: CPTII,S$GLB,, | Performed by: NURSE PRACTITIONER

## 2023-04-28 PROCEDURE — 3074F PR MOST RECENT SYSTOLIC BLOOD PRESSURE < 130 MM HG: ICD-10-PCS | Mod: CPTII,S$GLB,, | Performed by: NURSE PRACTITIONER

## 2023-04-28 PROCEDURE — 3074F SYST BP LT 130 MM HG: CPT | Mod: CPTII,S$GLB,, | Performed by: NURSE PRACTITIONER

## 2023-04-28 PROCEDURE — 3079F DIAST BP 80-89 MM HG: CPT | Mod: CPTII,S$GLB,, | Performed by: NURSE PRACTITIONER

## 2023-04-28 PROCEDURE — 99214 OFFICE O/P EST MOD 30 MIN: CPT | Mod: S$GLB,,, | Performed by: NURSE PRACTITIONER

## 2023-04-28 PROCEDURE — 1159F MED LIST DOCD IN RCRD: CPT | Mod: CPTII,S$GLB,, | Performed by: NURSE PRACTITIONER

## 2023-04-28 PROCEDURE — 4010F PR ACE/ARB THEARPY RXD/TAKEN: ICD-10-PCS | Mod: CPTII,S$GLB,, | Performed by: NURSE PRACTITIONER

## 2023-04-28 PROCEDURE — 3008F BODY MASS INDEX DOCD: CPT | Mod: CPTII,S$GLB,, | Performed by: NURSE PRACTITIONER

## 2023-04-28 PROCEDURE — 3008F PR BODY MASS INDEX (BMI) DOCUMENTED: ICD-10-PCS | Mod: CPTII,S$GLB,, | Performed by: NURSE PRACTITIONER

## 2023-04-28 RX ORDER — ALPRAZOLAM 0.5 MG/1
0.5 TABLET ORAL 2 TIMES DAILY PRN
Qty: 20 TABLET | Refills: 0 | Status: SHIPPED | OUTPATIENT
Start: 2023-04-28

## 2023-04-28 RX ORDER — LISINOPRIL AND HYDROCHLOROTHIAZIDE 10; 12.5 MG/1; MG/1
1 TABLET ORAL DAILY
Qty: 90 TABLET | Refills: 1 | Status: SHIPPED | OUTPATIENT
Start: 2023-04-28

## 2023-04-28 NOTE — PROGRESS NOTES
SUBJECTIVE:      Patient ID: Joo Roman is a 47 y.o. male.    Chief Complaint: Follow-up and Hypertension    6-year-old male presents to the clinic for hypertension follow-up. Last hypertension follow-up 1 year ago.     He is prescribed lisinopril-hydrochlorothiazide 10-12.5 mg. Blood pressure is stable today.  Weight is gradually declining.  He has lost 23.8 lb since last June through diet changes, on Keto diet.  He does get mild dependent edema and for this he wears compression socks and takes frequent breaks to ambulate around the office.  Labs were ordered March 2022, but never completed.      He has to start traveling  for his job.  Reports anxiety with flying causing panic. He has tried distracting himself with music without relief.  He would like something to help with anxiety.     He has an umbilical hernia that has been present for many years.  Patient reports the hernia is more noticeable after losing weight. The area is tender at time, but remains soft and reducible.  Reports a umbilical hernia surgery x3 in the past.  Colon cancer screening discussed with patient. He has never had a colonoscopy.  No family hx of colon cancer. Denies symptoms.     Hypertension  This is a chronic problem. The current episode started more than 1 year ago. The problem is controlled. Associated symptoms include peripheral edema. Pertinent negatives include no anxiety, blurred vision, chest pain, headaches, malaise/fatigue, neck pain, orthopnea, palpitations, PND, shortness of breath or sweats. There are no associated agents to hypertension. Risk factors for coronary artery disease include family history, male gender, obesity, sedentary lifestyle and stress. Past treatments include ACE inhibitors and diuretics. The current treatment provides significant improvement. Compliance problems include exercise and diet.  There is no history of angina. Identifiable causes of hypertension include sleep apnea.     Family History    Problem Relation Age of Onset    Thyroid disease Mother     Heart disease Father       Social History     Socioeconomic History    Marital status:    Occupational History    Occupation:      Comment: [employer]   Tobacco Use    Smoking status: Never    Smokeless tobacco: Never   Substance and Sexual Activity    Alcohol use: Yes     Alcohol/week: 1.0 - 2.0 standard drink     Types: 1 - 2 Shots of liquor per week    Drug use: Never    Sexual activity: Yes     Partners: Female     Birth control/protection: None     Current Outpatient Medications   Medication Sig Dispense Refill    ALPRAZolam (XANAX) 0.5 MG tablet Take 1 tablet (0.5 mg total) by mouth 2 (two) times daily as needed for Anxiety (Flight anxiety). 20 tablet 0    lisinopriL-hydrochlorothiazide (PRINZIDE,ZESTORETIC) 10-12.5 mg per tablet Take 1 tablet by mouth once daily. 90 tablet 1     No current facility-administered medications for this visit.     Review of patient's allergies indicates:  No Known Allergies   History reviewed. No pertinent past medical history.  Past Surgical History:   Procedure Laterality Date    ADENOIDECTOMY  2008    BALLOON SINUPLASTY OF PARANASAL SINUS  2008    BICEPS TENDON REPAIR Right 2015    Dr Garcia    HERNIA REPAIR      x3, 2000, mesh repair 2006? & 2008    TENDON REPAIR Left 2011    ankle, Dr Brown    TONSILLECTOMY  2008       Review of Systems   Constitutional:  Negative for activity change, appetite change, chills, diaphoresis, fatigue, fever, malaise/fatigue and unexpected weight change.   HENT:  Negative for congestion, ear pain, sinus pressure, sore throat, trouble swallowing and voice change.    Eyes:  Negative for blurred vision, pain, discharge and visual disturbance.   Respiratory:  Negative for cough, chest tightness, shortness of breath and wheezing.    Cardiovascular:  Negative for chest pain, palpitations, orthopnea and PND.        Hypertension    Gastrointestinal:  Negative for  "abdominal pain, constipation, diarrhea, nausea and vomiting.        Umbilical hernia   Genitourinary:  Negative for difficulty urinating, flank pain, frequency and urgency.   Musculoskeletal:  Negative for back pain, joint swelling and neck pain.   Skin:  Negative for color change and rash.   Neurological:  Negative for dizziness, seizures, syncope, weakness, numbness and headaches.   Hematological:  Negative for adenopathy.   Psychiatric/Behavioral:  Negative for dysphoric mood and sleep disturbance. The patient is nervous/anxious (with flying).     OBJECTIVE:      Vitals:    04/28/23 0930   BP: 126/86   BP Location: Left arm   Patient Position: Sitting   BP Method: Large (Automatic)   Pulse: 83   Temp: 98.4 °F (36.9 °C)   TempSrc: Oral   SpO2: 99%   Weight: (!) 142.1 kg (313 lb 4.8 oz)   Height: 6' 3" (1.905 m)     Physical Exam  Vitals and nursing note reviewed.   Constitutional:       General: He is awake. He is not in acute distress.     Appearance: Normal appearance. He is obese. He is not ill-appearing, toxic-appearing or diaphoretic.   HENT:      Head: Normocephalic and atraumatic.      Right Ear: Tympanic membrane, ear canal and external ear normal. There is no impacted cerumen.      Left Ear: Tympanic membrane, ear canal and external ear normal. There is no impacted cerumen.      Nose: Nose normal.   Eyes:      General: Lids are normal. Gaze aligned appropriately.      Conjunctiva/sclera: Conjunctivae normal.      Right eye: Right conjunctiva is not injected.      Left eye: Left conjunctiva is not injected.      Pupils: Pupils are equal, round, and reactive to light.   Cardiovascular:      Rate and Rhythm: Normal rate and regular rhythm.      Pulses: Normal pulses.      Heart sounds: S1 normal and S2 normal. Heart sounds are distant. No murmur heard.    No friction rub. No gallop.   Pulmonary:      Effort: Pulmonary effort is normal. No respiratory distress.      Breath sounds: Normal breath sounds. No " stridor. No decreased breath sounds, wheezing, rhonchi or rales.   Chest:      Chest wall: No tenderness.   Abdominal:      Hernia: A hernia is present. Hernia is present in the umbilical area.      Comments: Soft and reducible    Musculoskeletal:      Cervical back: Neck supple.      Right lower leg: No edema.      Left lower leg: No edema.   Lymphadenopathy:      Cervical: No cervical adenopathy.   Skin:     General: Skin is warm and dry.      Capillary Refill: Capillary refill takes less than 2 seconds.      Findings: No erythema or rash.   Neurological:      Mental Status: He is alert and oriented to person, place, and time. Mental status is at baseline.   Psychiatric:         Attention and Perception: Attention normal.         Mood and Affect: Mood normal.         Speech: Speech normal.         Behavior: Behavior normal. Behavior is cooperative.         Thought Content: Thought content normal.         Judgment: Judgment normal.      Assessment:       1. Essential hypertension    2. Anxiety with flying    3. Dependent edema    4. Class 2 severe obesity due to excess calories with serious comorbidity and body mass index (BMI) of 39.0 to 39.9 in adult    5. Umbilical hernia without obstruction and without gangrene    6. Screening for diabetes mellitus        Plan:       Essential hypertension  Stable with lisinopril-hctz 10-12.5 mg. Get labs completed ASAP. Reduce the amount of salt in your diet; Lose weight; Avoid drinking too much alcohol; Exercise at least 30 minutes per day most days of the week.  Continue current medications and home BP monitoring.  -     Comprehensive Metabolic Panel; Future; Expected date: 04/28/2023  -     Lipid Panel; Future; Expected date: 04/28/2023  -     TSH; Future; Expected date: 04/28/2023  -     Urinalysis; Future; Expected date: 04/28/2023  -     Microalbumin/Creatinine Ratio, Urine; Future; Expected date: 04/28/2023  -     lisinopriL-hydrochlorothiazide (PRINZIDE,ZESTORETIC)  10-12.5 mg per tablet; Take 1 tablet by mouth once daily.  Dispense: 90 tablet; Refill: 1    Anxiety with flying  Xanax prn flight anxiety only.  Do not take daily. Only for as needed.   -     ALPRAZolam (XANAX) 0.5 MG tablet; Take 1 tablet (0.5 mg total) by mouth 2 (two) times daily as needed for Anxiety (Flight anxiety).  Dispense: 20 tablet; Refill: 0    Dependent edema  Stable, no edema today, continue current meds.  Continue to wear compression socks and take frequent breaks at work if sitting for long periods of time.   -     lisinopriL-hydrochlorothiazide (PRINZIDE,ZESTORETIC) 10-12.5 mg per tablet; Take 1 tablet by mouth once daily.  Dispense: 90 tablet; Refill: 1    Class 2 severe obesity due to excess calories with serious comorbidity and body mass index (BMI) of 39.0 to 39.9 in adult  Excessive time spent in sedentary behavior, such as sitting, is associated with deleterious health outcomes including abnormal glucose metabolism and increased mortality. Reducing sedentary time, independent of physical activity, has known cardiovascular, metabolic, and functional benefits in all adults. Any amount of exercise is better than being sedentary.  -     Comprehensive Metabolic Panel; Future; Expected date: 04/28/2023  -     Lipid Panel; Future; Expected date: 04/28/2023  -     TSH; Future; Expected date: 04/28/2023  -     Hemoglobin A1C; Future; Expected date: 04/28/2023    Umbilical hernia without obstruction and without gangrene  Stable, hernia is soft and reducible.  He is not ready for surgery at this time.      Screening for diabetes mellitus  -     Comprehensive Metabolic Panel; Future; Expected date: 04/28/2023  -     Hemoglobin A1C; Future; Expected date: 04/28/2023    Side effects of new medication discussed with patient; understanding voiced.    I spent a total of 24 minutes on the day of the visit.This includes face to face time and non-face to face time preparing to see the patient (eg, review of  tests), obtaining and/or reviewing separately obtained history, documenting clinical information in the electronic or other health record, independently interpreting results and communicating results to the patient/family/caregiver, or care coordinator.    Follow up in about 6 months (around 10/28/2023) for HTN.      4/28/2023 RICARDO Ruby, BILLP

## 2023-12-26 ENCOUNTER — OFFICE VISIT (OUTPATIENT)
Dept: URGENT CARE | Facility: CLINIC | Age: 48
End: 2023-12-26
Payer: COMMERCIAL

## 2023-12-26 VITALS
BODY MASS INDEX: 39.17 KG/M2 | DIASTOLIC BLOOD PRESSURE: 104 MMHG | WEIGHT: 315 LBS | OXYGEN SATURATION: 100 % | HEIGHT: 75 IN | TEMPERATURE: 97 F | HEART RATE: 75 BPM | SYSTOLIC BLOOD PRESSURE: 155 MMHG | RESPIRATION RATE: 18 BRPM

## 2023-12-26 DIAGNOSIS — R52 PAIN: ICD-10-CM

## 2023-12-26 DIAGNOSIS — S63.256A CLOSED DISLOCATION OF RIGHT LITTLE FINGER: Primary | ICD-10-CM

## 2023-12-26 PROCEDURE — 99214 OFFICE O/P EST MOD 30 MIN: CPT | Mod: S$GLB,,,

## 2023-12-26 PROCEDURE — 73140 X-RAY EXAM OF FINGER(S): CPT | Mod: RT,S$GLB,, | Performed by: RADIOLOGY

## 2023-12-26 PROCEDURE — 99214 PR OFFICE/OUTPT VISIT, EST, LEVL IV, 30-39 MIN: ICD-10-PCS | Mod: S$GLB,,,

## 2023-12-26 PROCEDURE — 73140 XR FINGER 2 OR MORE VIEWS RIGHT: ICD-10-PCS | Mod: RT,S$GLB,, | Performed by: RADIOLOGY

## 2023-12-27 NOTE — PROGRESS NOTES
"Subjective:      Patient ID: Joo Roman is a 48 y.o. male.    Vitals:  height is 6' 3" (1.905 m) and weight is 142.9 kg (315 lb) (abnormal). His oral temperature is 97.4 °F (36.3 °C). His blood pressure is 155/104 (abnormal) and his pulse is 75. His respiration is 18 and oxygen saturation is 100%.     Chief Complaint: Finger Injury    Pt states "he injured his R pinky while play fighting with his son." States finger was initially slightly sideways and he was able to straighten it slightly. Patient is not able to straighten finger at all. It is bent toward the palm. There appears to be good blood flow, cap refill < 3 seconds in finger. Discussed with patient he needs to go directly to the ER for further evaluation and treatment and reset of finger.       Constitution: Negative.   HENT: Negative.     Neck: neck negative.   Cardiovascular: Negative.    Eyes: Negative.    Respiratory: Negative.     Gastrointestinal: Negative.    Genitourinary: Negative.    Musculoskeletal:  Positive for pain, joint pain and abnormal ROM of joint.   Skin: Negative.    Neurological: Negative.    Hematologic/Lymphatic: Negative.       Objective:     Physical Exam   Constitutional: He is oriented to person, place, and time. No distress. normal  HENT:   Head: Normocephalic and atraumatic.   Ears:   Right Ear: External ear normal.   Left Ear: External ear normal.   Nose: Nose normal.   Mouth/Throat: Mucous membranes are moist. Oropharynx is clear.   Eyes: Conjunctivae are normal.   Cardiovascular: Normal rate.   Pulmonary/Chest: Effort normal. No respiratory distress.   Abdominal: Normal appearance.   Musculoskeletal:         General: Swelling, tenderness, deformity and signs of injury (right pinky dislocation) present.   Neurological: He is alert and oriented to person, place, and time. He displays no weakness.   Skin: Skin is warm and dry. Capillary refill takes less than 2 seconds.   Psychiatric: His behavior is normal. Mood, judgment and " thought content normal.   Nursing note and vitals reviewed.      Assessment:     1. Closed dislocation of right little finger    2. Pain      EXAMINATION:  XR FINGER 2 OR MORE VIEWS RIGHT     CLINICAL HISTORY:  Pain, unspecified     TECHNIQUE:  Three views right 5th finger     COMPARISON:  None     FINDINGS:  The right 5th finger is held in 90° flexion at the proximal interphalangeal joint, and may be dislocated.  No fracture is demonstrated.     Impression:     As above        Electronically signed by: Sarkis Garvin MD  Date:                                            12/27/2023  Time:                                           08:19    Plan:       Closed dislocation of right little finger    Pain  -     X-Ray Finger 2 or More Views Right; Future; Expected date: 12/26/2023      Patient's wife transporting him to the ER for management of finger injury.

## 2024-03-12 ENCOUNTER — PATIENT MESSAGE (OUTPATIENT)
Dept: ADMINISTRATIVE | Facility: HOSPITAL | Age: 49
End: 2024-03-12
Payer: COMMERCIAL

## 2024-05-20 ENCOUNTER — PATIENT MESSAGE (OUTPATIENT)
Dept: ADMINISTRATIVE | Facility: HOSPITAL | Age: 49
End: 2024-05-20
Payer: COMMERCIAL

## 2024-07-09 ENCOUNTER — PATIENT MESSAGE (OUTPATIENT)
Dept: ADMINISTRATIVE | Facility: HOSPITAL | Age: 49
End: 2024-07-09
Payer: COMMERCIAL

## 2024-08-01 ENCOUNTER — TELEPHONE (OUTPATIENT)
Dept: FAMILY MEDICINE | Facility: CLINIC | Age: 49
End: 2024-08-01
Payer: COMMERCIAL

## 2024-08-01 NOTE — TELEPHONE ENCOUNTER
Called Pt to let him know that we were able to get him August 16th at 11:20 AM. Patient verbalized understanding and said he would be there.

## 2024-08-01 NOTE — TELEPHONE ENCOUNTER
Called pt to schedule a f/u appt. Pt said he wants to see Stuart on August 16th 2024 at 10:40 AM. Due to a schedule block, we told him we would get it scheduled and call him back.

## 2024-08-16 ENCOUNTER — OFFICE VISIT (OUTPATIENT)
Dept: FAMILY MEDICINE | Facility: CLINIC | Age: 49
End: 2024-08-16
Payer: COMMERCIAL

## 2024-08-16 VITALS
HEIGHT: 75 IN | OXYGEN SATURATION: 99 % | WEIGHT: 315 LBS | DIASTOLIC BLOOD PRESSURE: 90 MMHG | HEART RATE: 81 BPM | BODY MASS INDEX: 39.17 KG/M2 | SYSTOLIC BLOOD PRESSURE: 142 MMHG

## 2024-08-16 DIAGNOSIS — I10 ESSENTIAL HYPERTENSION: Primary | ICD-10-CM

## 2024-08-16 DIAGNOSIS — E66.01 CLASS 2 SEVERE OBESITY DUE TO EXCESS CALORIES WITH SERIOUS COMORBIDITY AND BODY MASS INDEX (BMI) OF 39.0 TO 39.9 IN ADULT: ICD-10-CM

## 2024-08-16 DIAGNOSIS — R21 FACIAL RASH: ICD-10-CM

## 2024-08-16 DIAGNOSIS — R79.89 LOW TESTOSTERONE: ICD-10-CM

## 2024-08-16 DIAGNOSIS — Z13.1 SCREENING FOR DIABETES MELLITUS (DM): ICD-10-CM

## 2024-08-16 PROCEDURE — 99999 PR PBB SHADOW E&M-EST. PATIENT-LVL IV: CPT | Mod: PBBFAC,,, | Performed by: NURSE PRACTITIONER

## 2024-08-16 RX ORDER — LISINOPRIL AND HYDROCHLOROTHIAZIDE 10; 12.5 MG/1; MG/1
1 TABLET ORAL DAILY
Qty: 90 TABLET | Refills: 1 | Status: SHIPPED | OUTPATIENT
Start: 2024-08-16

## 2024-08-16 NOTE — PROGRESS NOTES
SUBJECTIVE:      Patient ID: Joo Roman is a 48 y.o. male.    Chief Complaint: Follow-up    48-year-old male presents to the clinic for hypertension follow-up.  He has been lost in follow-up, last office visit over 1 year ago.      He has been doing well. He is attempting to lose weight through diet. He is currently eating a low carb diet, consuming mostly meats and vegetables.  His weight is on the decline, but has noticed with the weight loss he developed a rash to his face, which as been present x1 year. He has seen dermatology, tried various steroid creams and face washes without relief. No hx of lupus. Denies fever, joint pain, dry mouth, body aches, mouth ulcers.     He is interested in weight loss treatment, reports no history of thyroid cancer/tumors and denies history of pancreatitis.      Blood pressure is elevated today.  He has been off his BP medication.  He is due for routine labs.  Labs have been ordered in the past but not completed. Denies chest pain, shortness of breath, and lower extremity swelling.     He would like to have his testosterone level checked, gives a history of low testosterone and previously on testosterone injections.           Family History   Problem Relation Name Age of Onset    Thyroid disease Mother      Heart disease Father        Social History     Socioeconomic History    Marital status:    Occupational History    Occupation:      Comment: [employer]   Tobacco Use    Smoking status: Never    Smokeless tobacco: Never   Substance and Sexual Activity    Alcohol use: Yes     Alcohol/week: 1.0 - 2.0 standard drink of alcohol     Types: 1 - 2 Shots of liquor per week    Drug use: Never    Sexual activity: Yes     Partners: Female     Birth control/protection: None     Social Determinants of Health     Financial Resource Strain: Low Risk  (8/16/2024)    Overall Financial Resource Strain (CARDIA)     Difficulty of Paying Living Expenses: Not hard  at all   Food Insecurity: No Food Insecurity (8/16/2024)    Hunger Vital Sign     Worried About Running Out of Food in the Last Year: Never true     Ran Out of Food in the Last Year: Never true   Physical Activity: Unknown (8/16/2024)    Exercise Vital Sign     Days of Exercise per Week: 0 days   Stress: Stress Concern Present (8/16/2024)    [facility] [place] [facility] - Occupational Stress Questionnaire     Feeling of Stress : To some extent   Housing Stability: Unknown (8/16/2024)    Housing Stability Vital Sign     Unable to Pay for Housing in the Last Year: No     Current Outpatient Medications   Medication Sig Dispense Refill    ALPRAZolam (XANAX) 0.5 MG tablet Take 1 tablet (0.5 mg total) by mouth 2 (two) times daily as needed for Anxiety (Flight anxiety). 20 tablet 0    lisinopriL-hydrochlorothiazide (PRINZIDE,ZESTORETIC) 10-12.5 mg per tablet Take 1 tablet by mouth once daily. 90 tablet 1     No current facility-administered medications for this visit.     Review of patient's allergies indicates:  No Known Allergies   History reviewed. No pertinent past medical history.  Past Surgical History:   Procedure Laterality Date    ADENOIDECTOMY  2008    BALLOON SINUPLASTY OF PARANASAL SINUS  2008    BICEPS TENDON REPAIR Right 2015    Dr Garcia    HERNIA REPAIR      x3, 2000, mesh repair 2006? & 2008    TENDON REPAIR Left 2011    ankle, Dr Brown    TONSILLECTOMY  2008       Review of Systems   Constitutional:  Negative for activity change, appetite change, chills, diaphoresis, fatigue, fever and unexpected weight change.   HENT:  Negative for congestion, ear pain, hearing loss, rhinorrhea, sinus pressure, sore throat, trouble swallowing and voice change.    Eyes:  Negative for pain, discharge and visual disturbance.   Respiratory:  Negative for cough, chest tightness, shortness of breath and wheezing.    Cardiovascular:  Negative for chest pain, palpitations and leg swelling.   Gastrointestinal:   "Negative for abdominal pain, blood in stool, constipation, diarrhea, nausea and vomiting.   Endocrine: Negative for polydipsia and polyuria.   Genitourinary:  Negative for difficulty urinating, flank pain, frequency, hematuria and urgency.   Musculoskeletal:  Negative for arthralgias, back pain, joint swelling and neck pain.   Skin:  Positive for rash. Negative for color change.   Neurological:  Negative for dizziness, seizures, syncope, weakness, numbness and headaches.   Hematological:  Negative for adenopathy.   Psychiatric/Behavioral:  Negative for confusion, dysphoric mood and sleep disturbance. The patient is not nervous/anxious.       OBJECTIVE:      Vitals:    08/16/24 1120 08/16/24 1139   BP: (!) 142/92 (!) 142/90   BP Location: Left arm    Patient Position: Sitting    BP Method: Large (Manual)    Pulse: 81    SpO2: 99%    Weight: (!) 144.7 kg (319 lb)    Height: 6' 3" (1.905 m)      Physical Exam  Vitals and nursing note reviewed.   Constitutional:       General: He is awake. He is not in acute distress.     Appearance: He is well-developed and well-groomed. He is obese. He is not ill-appearing, toxic-appearing or diaphoretic.   HENT:      Head: Normocephalic and atraumatic.        Comments: Maculopapular rash to face     Nose: Nose normal.   Eyes:      General: Lids are normal. Gaze aligned appropriately.      Conjunctiva/sclera: Conjunctivae normal.      Right eye: Right conjunctiva is not injected.      Left eye: Left conjunctiva is not injected.      Pupils: Pupils are equal, round, and reactive to light.   Cardiovascular:      Rate and Rhythm: Normal rate and regular rhythm.      Pulses: Normal pulses.      Heart sounds: Normal heart sounds, S1 normal and S2 normal. No murmur heard.     No friction rub. No gallop.   Pulmonary:      Effort: Pulmonary effort is normal. No respiratory distress.      Breath sounds: Normal breath sounds. No stridor. No decreased breath sounds, wheezing, rhonchi or rales. "   Chest:      Chest wall: No tenderness.   Musculoskeletal:      Cervical back: Neck supple.      Right lower leg: No edema.      Left lower leg: No edema.   Lymphadenopathy:      Cervical: No cervical adenopathy.   Skin:     General: Skin is warm and dry.      Capillary Refill: Capillary refill takes less than 2 seconds.      Findings: Rash present. No erythema. Rash is macular and papular.   Neurological:      Mental Status: He is alert and oriented to person, place, and time. Mental status is at baseline.   Psychiatric:         Attention and Perception: Attention normal.         Mood and Affect: Mood normal.         Speech: Speech normal.         Behavior: Behavior normal. Behavior is cooperative.         Thought Content: Thought content normal.         Judgment: Judgment normal.        Assessment:       1. Essential hypertension    2. Low testosterone    3. Facial rash    4. Screening for diabetes mellitus (DM)    5. Class 2 severe obesity due to excess calories with serious comorbidity and body mass index (BMI) of 39.0 to 39.9 in adult        Plan:       Essential hypertension  Uncontrolled, restart lisinopriL-hydrochlorothiazide 10-12.5 mg daily. Reduce the amount of salt in your diet; Lose weight; Avoid drinking too much alcohol; Exercise at least 30 minutes per day most days of the week.  Continue current medications and home BP monitoring.  -     lisinopriL-hydrochlorothiazide (PRINZIDE,ZESTORETIC) 10-12.5 mg per tablet; Take 1 tablet by mouth once daily.  Dispense: 90 tablet; Refill: 1  -     Comprehensive Metabolic Panel; Future; Expected date: 08/16/2024  -     Lipid Panel; Future; Expected date: 08/16/2024  -     TSH; Future; Expected date: 08/16/2024  -     Microalbumin/Creatinine Ratio, Urine; Future; Expected date: 08/16/2024    Low testosterone  Previously on testosterone injections. If testosterone is low will refer to urology for treatment.   -     Testosterone; Future; Expected date:  08/16/2024    Facial rash  Rash to face is in butterfly pattern, will check RAMON. If positive will send to rheumatology to r/o lupus.   -     RAMON IFA Screen With Reflex To Titer and Pattern; Future; Expected date: 08/17/2024    Screening for diabetes mellitus (DM)  -     Comprehensive Metabolic Panel; Future; Expected date: 08/16/2024  -     Hemoglobin A1C; Future; Expected date: 08/16/2024    Class 2 severe obesity due to excess calories with serious comorbidity and body mass index (BMI) of 39.0 to 39.9 in adult  A1c pending. If normal will send in prescription of Mounjaro. Excessive time spent in sedentary behavior, such as sitting, is associated with deleterious health outcomes including abnormal glucose metabolism and increased mortality. Reducing sedentary time, independent of physical activity, has known cardiovascular, metabolic, and functional benefits in all adults. Any amount of exercise is better than being sedentary.    This note was created using [employer] voice recognition software that occasionally misinterprets phrases or words.     I spent a total of 27 minutes on the day of the visit.This includes face to face time and non-face to face time preparing to see the patient (eg, review of tests), obtaining and/or reviewing separately obtained history, documenting clinical information in the electronic or other health record, independently interpreting results and communicating results to the patient/family/caregiver, or care coordinator.    Follow up in about 1 month (around 9/16/2024) for HTN and lab review.          8/16/2024 RICARDO Ruby, BILLP

## 2024-09-16 ENCOUNTER — OFFICE VISIT (OUTPATIENT)
Dept: FAMILY MEDICINE | Facility: CLINIC | Age: 49
End: 2024-09-16
Payer: COMMERCIAL

## 2024-09-16 VITALS
SYSTOLIC BLOOD PRESSURE: 120 MMHG | DIASTOLIC BLOOD PRESSURE: 82 MMHG | OXYGEN SATURATION: 96 % | BODY MASS INDEX: 40.25 KG/M2 | HEART RATE: 70 BPM | TEMPERATURE: 98 F | WEIGHT: 315 LBS

## 2024-09-16 DIAGNOSIS — E78.49 OTHER HYPERLIPIDEMIA: ICD-10-CM

## 2024-09-16 DIAGNOSIS — F90.9 ATTENTION DEFICIT HYPERACTIVITY DISORDER (ADHD), UNSPECIFIED ADHD TYPE: ICD-10-CM

## 2024-09-16 DIAGNOSIS — I10 ESSENTIAL HYPERTENSION: Primary | ICD-10-CM

## 2024-09-16 DIAGNOSIS — E66.01 CLASS 3 SEVERE OBESITY DUE TO EXCESS CALORIES WITH SERIOUS COMORBIDITY AND BODY MASS INDEX (BMI) OF 40.0 TO 44.9 IN ADULT: ICD-10-CM

## 2024-09-16 DIAGNOSIS — Z12.11 SCREENING FOR COLON CANCER: ICD-10-CM

## 2024-09-16 PROCEDURE — 4010F ACE/ARB THERAPY RXD/TAKEN: CPT | Mod: CPTII,S$GLB,, | Performed by: NURSE PRACTITIONER

## 2024-09-16 PROCEDURE — 3079F DIAST BP 80-89 MM HG: CPT | Mod: CPTII,S$GLB,, | Performed by: NURSE PRACTITIONER

## 2024-09-16 PROCEDURE — 1160F RVW MEDS BY RX/DR IN RCRD: CPT | Mod: CPTII,S$GLB,, | Performed by: NURSE PRACTITIONER

## 2024-09-16 PROCEDURE — 3008F BODY MASS INDEX DOCD: CPT | Mod: CPTII,S$GLB,, | Performed by: NURSE PRACTITIONER

## 2024-09-16 PROCEDURE — 1159F MED LIST DOCD IN RCRD: CPT | Mod: CPTII,S$GLB,, | Performed by: NURSE PRACTITIONER

## 2024-09-16 PROCEDURE — 3061F NEG MICROALBUMINURIA REV: CPT | Mod: CPTII,S$GLB,, | Performed by: NURSE PRACTITIONER

## 2024-09-16 PROCEDURE — 3044F HG A1C LEVEL LT 7.0%: CPT | Mod: CPTII,S$GLB,, | Performed by: NURSE PRACTITIONER

## 2024-09-16 PROCEDURE — 3074F SYST BP LT 130 MM HG: CPT | Mod: CPTII,S$GLB,, | Performed by: NURSE PRACTITIONER

## 2024-09-16 PROCEDURE — 99999 PR PBB SHADOW E&M-EST. PATIENT-LVL III: CPT | Mod: PBBFAC,,, | Performed by: NURSE PRACTITIONER

## 2024-09-16 PROCEDURE — 3066F NEPHROPATHY DOC TX: CPT | Mod: CPTII,S$GLB,, | Performed by: NURSE PRACTITIONER

## 2024-09-16 PROCEDURE — G2211 COMPLEX E/M VISIT ADD ON: HCPCS | Mod: 95,S$GLB,, | Performed by: NURSE PRACTITIONER

## 2024-09-16 PROCEDURE — 99214 OFFICE O/P EST MOD 30 MIN: CPT | Mod: S$GLB,,, | Performed by: NURSE PRACTITIONER

## 2024-09-16 RX ORDER — TIRZEPATIDE 2.5 MG/.5ML
2.5 INJECTION, SOLUTION SUBCUTANEOUS
Qty: 2 ML | Refills: 0 | Status: SHIPPED | OUTPATIENT
Start: 2024-09-16 | End: 2024-09-16

## 2024-09-16 RX ORDER — DEXTROAMPHETAMINE SACCHARATE, AMPHETAMINE ASPARTATE MONOHYDRATE, DEXTROAMPHETAMINE SULFATE AND AMPHETAMINE SULFATE 2.5; 2.5; 2.5; 2.5 MG/1; MG/1; MG/1; MG/1
10 CAPSULE, EXTENDED RELEASE ORAL EVERY MORNING
Qty: 30 CAPSULE | Refills: 0 | Status: SHIPPED | OUTPATIENT
Start: 2024-09-16

## 2024-09-16 RX ORDER — TIRZEPATIDE 2.5 MG/.5ML
2.5 INJECTION, SOLUTION SUBCUTANEOUS
Qty: 2 ML | Refills: 0 | Status: SHIPPED | OUTPATIENT
Start: 2024-09-16 | End: 2024-10-16

## 2024-09-16 NOTE — PROGRESS NOTES
SUBJECTIVE:      Patient ID: Joo Roman is a 48 y.o. male.    Chief Complaint: Hypertension    48-year-old male presents to the clinic for HTN follow-up and lab review. Recent labs completed were reviewed.     RAMON was ordered due to a rash he has on his face, but the lab was not collected. Remaining labs are stable.Slight elevation in glucose, but A1c was normal. Cholesterol is stable.      He would like to start weight loss treatment. He is interested in Zepbound or Wegovy. He is insure if his insurance will cover the medication. He reprots no hx of thyroid cancer/tumors or pancreatitis. BMI is elevated at 40.25. He has been trying to watch his diet by cutting carbs, but does travel on weekends for soccer, wich makes dieting difficult.     He would like to restart ADHD meds, previously on Adderall. Last screening was 6-7 years ago by former PCP. Test results of screening not available. Reports difficulty focusing and concentrating at work. Denies chest pain and palpations.     Colon cancer screening discussed. Patient amenable to Cologuard.        Family History   Problem Relation Name Age of Onset    Thyroid disease Mother      Heart disease Father        Social History     Socioeconomic History    Marital status:    Occupational History    Occupation:      Comment: [employer]   Tobacco Use    Smoking status: Never    Smokeless tobacco: Never   Substance and Sexual Activity    Alcohol use: Yes     Alcohol/week: 1.0 - 2.0 standard drink of alcohol     Types: 1 - 2 Shots of liquor per week    Drug use: Never    Sexual activity: Yes     Partners: Female     Birth control/protection: None     Social Determinants of Health     Financial Resource Strain: Low Risk  (8/16/2024)    Overall Financial Resource Strain (CARDIA)     Difficulty of Paying Living Expenses: Not hard at all   Food Insecurity: No Food Insecurity (8/16/2024)    Hunger Vital Sign     Worried About Running Out of Food  in the Last Year: Never true     Ran Out of Food in the Last Year: Never true   Physical Activity: Unknown (8/16/2024)    Exercise Vital Sign     Days of Exercise per Week: 0 days   Stress: Stress Concern Present (8/16/2024)    [place] [place] [facility] [employer]ational [facility] - Occupational Stress Questionnaire     Feeling of Stress : To some extent   Housing Stability: Unknown (8/16/2024)    Housing Stability Vital Sign     Unable to Pay for Housing in the Last Year: No     Current Outpatient Medications   Medication Sig Dispense Refill    ALPRAZolam (XANAX) 0.5 MG tablet Take 1 tablet (0.5 mg total) by mouth 2 (two) times daily as needed for Anxiety (Flight anxiety). 20 tablet 0    lisinopriL-hydrochlorothiazide (PRINZIDE,ZESTORETIC) 10-12.5 mg per tablet Take 1 tablet by mouth once daily. 90 tablet 1    dextroamphetamine-amphetamine (ADDERALL XR) 10 MG 24 hr capsule Take 1 capsule (10 mg total) by mouth every morning. 30 capsule 0    tirzepatide, weight loss, (ZEPBOUND) 2.5 mg/0.5 mL PnIj Inject 2.5 mg into the skin every 7 days. 2 mL 0     No current facility-administered medications for this visit.     Review of patient's allergies indicates:  No Known Allergies   History reviewed. No pertinent past medical history.  Past Surgical History:   Procedure Laterality Date    ADENOIDECTOMY  2008    BALLOON SINUPLASTY OF PARANASAL SINUS  2008    BICEPS TENDON REPAIR Right 2015    Dr Garcia    HERNIA REPAIR      x3, 2000, mesh repair 2006? & 2008    TENDON REPAIR Left 2011    ankle, Dr Brown    TONSILLECTOMY  2008       Review of Systems   Constitutional:  Negative for activity change, appetite change, chills, diaphoresis, fatigue, fever and unexpected weight change.   HENT:  Negative for hearing loss, rhinorrhea and trouble swallowing.    Eyes:  Negative for discharge and visual disturbance.   Respiratory:  Negative for chest tightness, shortness of breath and wheezing.    Cardiovascular:  Negative for chest pain and  palpitations.   Gastrointestinal:  Negative for blood in stool, constipation, diarrhea and vomiting.   Endocrine: Negative for polydipsia and polyuria.   Genitourinary:  Negative for difficulty urinating, hematuria and urgency.   Musculoskeletal:  Negative for arthralgias, joint swelling and neck pain.   Neurological:  Negative for weakness and headaches.   Psychiatric/Behavioral:  Positive for decreased concentration. Negative for confusion and dysphoric mood.       OBJECTIVE:      Vitals:    09/16/24 0958 09/16/24 1008   BP: (!) 138/90 120/82   Pulse: 70    Temp: 98.2 °F (36.8 °C)    SpO2: 96%    Weight: (!) 146.1 kg (322 lb)      Physical Exam  Vitals and nursing note reviewed.   Constitutional:       General: He is awake. He is not in acute distress.     Appearance: He is well-developed and well-groomed. He is obese. He is not ill-appearing, toxic-appearing or diaphoretic.   HENT:      Head: Normocephalic and atraumatic.      Nose: Nose normal.   Eyes:      General: Lids are normal. Gaze aligned appropriately.      Conjunctiva/sclera: Conjunctivae normal.      Right eye: Right conjunctiva is not injected.      Left eye: Left conjunctiva is not injected.      Pupils: Pupils are equal, round, and reactive to light.   Cardiovascular:      Rate and Rhythm: Normal rate and regular rhythm.      Pulses: Normal pulses.      Heart sounds: Normal heart sounds, S1 normal and S2 normal. No murmur heard.     No friction rub. No gallop.   Pulmonary:      Effort: Pulmonary effort is normal. No respiratory distress.      Breath sounds: Normal breath sounds. No stridor. No decreased breath sounds, wheezing, rhonchi or rales.   Chest:      Chest wall: No tenderness.   Musculoskeletal:      Cervical back: Neck supple.      Right lower leg: No edema.      Left lower leg: No edema.   Lymphadenopathy:      Cervical: No cervical adenopathy.   Skin:     General: Skin is warm and dry.      Capillary Refill: Capillary refill takes less  than 2 seconds.      Findings: No erythema or rash.   Neurological:      Mental Status: He is alert and oriented to person, place, and time. Mental status is at baseline.   Psychiatric:         Attention and Perception: Attention normal.         Mood and Affect: Mood normal.         Speech: Speech normal.         Behavior: Behavior normal. Behavior is cooperative.         Thought Content: Thought content normal.         Judgment: Judgment normal.        No visits with results within 1 Week(s) from this visit.   Latest known visit with results is:   Office Visit on 08/16/2024   Component Date Value Ref Range Status    Glucose 08/19/2024 104 (H)  70 - 99 mg/dL Final    BUN 08/19/2024 11  6 - 24 mg/dL Final    Creatinine 08/19/2024 0.99  0.76 - 1.27 mg/dL Final    eGFR 08/19/2024 95  >59 mL/min/1.73 Final    BUN/Creatinine Ratio 08/19/2024 11  9 - 20 Final    Sodium 08/19/2024 139  134 - 144 mmol/L Final    Potassium 08/19/2024 4.5  3.5 - 5.2 mmol/L Final    Chloride 08/19/2024 101  96 - 106 mmol/L Final    CO2 08/19/2024 26  20 - 29 mmol/L Final    Calcium 08/19/2024 9.2  8.7 - 10.2 mg/dL Final    Protein, Total 08/19/2024 6.5  6.0 - 8.5 g/dL Final    Albumin 08/19/2024 4.0 (L)  4.1 - 5.1 g/dL Final    Globulin, Total 08/19/2024 2.5  1.5 - 4.5 g/dL Final    Total Bilirubin 08/19/2024 0.7  0.0 - 1.2 mg/dL Final    Alkaline Phosphatase 08/19/2024 74  44 - 121 IU/L Final    AST 08/19/2024 15  0 - 40 IU/L Final    ALT 08/19/2024 17  0 - 44 IU/L Final    Cholesterol 08/19/2024 155  100 - 199 mg/dL Final    Triglycerides 08/19/2024 81  0 - 149 mg/dL Final    HDL 08/19/2024 37 (L)  >39 mg/dL Final    VLDL Cholesterol Lalit 08/19/2024 16  5 - 40 mg/dL Final    LDL Calculated 08/19/2024 102 (H)  0 - 99 mg/dL Final    TSH 08/19/2024 2.230  0.450 - 4.500 uIU/mL Final    Creatinine, Urine 08/19/2024 301.7  Not Estab. mg/dL Final    Microalb, Ur 08/19/2024 8.3  Not Estab. ug/mL Final    Microalb/Crt. Ratio 08/19/2024 3  0 - 29 mg/g  creat Final    Comment:                        Normal:                0 -  29                         Moderately increased: 30 - 300                         Severely increased:       >300      Hemoglobin A1c 08/19/2024 5.2  4.8 - 5.6 % Final    Comment:          Prediabetes: 5.7 - 6.4           Diabetes: >6.4           Glycemic control for adults with diabetes: <7.0      Testosterone 08/19/2024 300  264 - 916 ng/dL Final    Comment: Adult male reference interval is based on a population of  healthy nonobese males (BMI <30) between 19 and 39 years old.  marquita Schmitt.al. JCEM 2017,102;[id number]. PMID: [id number].       Assessment:       1. Essential hypertension    2. Other hyperlipidemia    3. Attention deficit hyperactivity disorder (ADHD), unspecified ADHD type    4. Class 3 severe obesity due to excess calories with serious comorbidity and body mass index (BMI) of 40.0 to 44.9 in adult    5. Screening for colon cancer        Plan:       Essential hypertension  Blood pressure is stable, continue lisinopril-hctz 10-12.5 mg. Reduce the amount of salt in your diet; Lose weight; Avoid drinking too much alcohol; Exercise at least 30 minutes per day most days of the week.  Continue current medications and home BP monitoring.    Other hyperlipidemia  Stable, managed with diet. ASCVD 10-year risk score 3%. Limit red meat, butter, fried foods, cheese, and other foods that have a lot of saturated fat. Consume more: lean meats, fish, fruits, vegetables, whole grains, beans, lentils, and nuts.  Weight loss, and 30-45 min of cardiovascular exercise daily.    Attention deficit hyperactivity disorder (ADHD), unspecified ADHD type  Will restart Adderall. Monitor BP.  reviewed. Follow-up in 3 months.   -     dextroamphetamine-amphetamine (ADDERALL XR) 10 MG 24 hr capsule; Take 1 capsule (10 mg total) by mouth every morning.  Dispense: 30 capsule; Refill: 0    Class 3 severe obesity due to excess calories with serious comorbidity  and body mass index (BMI) of 40.0 to 44.9 in adult  Trial of Zepbound to see if covered by insurance. Excessive time spent in sedentary behavior, such as sitting, is associated with deleterious health outcomes including abnormal glucose metabolism and increased mortality. Reducing sedentary time, independent of physical activity, has known cardiovascular, metabolic, and functional benefits in all adults. Any amount of exercise is better than being sedentary.  -     tirzepatide, weight loss, (ZEPBOUND) 2.5 mg/0.5 mL PnIj; Inject 2.5 mg into the skin every 7 days.  Dispense: 2 mL; Refill: 0    Screening for colon cancer  -     Cologuard Screening (Multitarget Stool DNA); Future; Expected date: 09/16/2024    This note was created using [employer] voice recognition software that occasionally misinterprets phrases or words.     I spent a total of 23 minutes on the day of the visit.This includes face to face time and non-face to face time preparing to see the patient (eg, review of tests), obtaining and/or reviewing separately obtained history, documenting clinical information in the electronic or other health record, independently interpreting results and communicating results to the patient/family/caregiver, or care coordinator.    Follow up in about 2 months (around 11/16/2024).          9/16/2024 RICARDO Ruby, MAXINE

## 2024-10-11 ENCOUNTER — TELEPHONE (OUTPATIENT)
Dept: FAMILY MEDICINE | Facility: CLINIC | Age: 49
End: 2024-10-11
Payer: COMMERCIAL

## 2024-10-15 DIAGNOSIS — E66.813 CLASS 3 SEVERE OBESITY DUE TO EXCESS CALORIES WITH SERIOUS COMORBIDITY AND BODY MASS INDEX (BMI) OF 40.0 TO 44.9 IN ADULT: ICD-10-CM

## 2024-10-15 DIAGNOSIS — E66.01 CLASS 3 SEVERE OBESITY DUE TO EXCESS CALORIES WITH SERIOUS COMORBIDITY AND BODY MASS INDEX (BMI) OF 40.0 TO 44.9 IN ADULT: Primary | ICD-10-CM

## 2024-10-15 DIAGNOSIS — E66.813 CLASS 3 SEVERE OBESITY DUE TO EXCESS CALORIES WITH SERIOUS COMORBIDITY AND BODY MASS INDEX (BMI) OF 40.0 TO 44.9 IN ADULT: Primary | ICD-10-CM

## 2024-10-15 DIAGNOSIS — F90.9 ATTENTION DEFICIT HYPERACTIVITY DISORDER (ADHD), UNSPECIFIED ADHD TYPE: ICD-10-CM

## 2024-10-15 DIAGNOSIS — E66.01 CLASS 3 SEVERE OBESITY DUE TO EXCESS CALORIES WITH SERIOUS COMORBIDITY AND BODY MASS INDEX (BMI) OF 40.0 TO 44.9 IN ADULT: ICD-10-CM

## 2024-10-15 RX ORDER — TIRZEPATIDE 5 MG/.5ML
5 INJECTION, SOLUTION SUBCUTANEOUS
Qty: 2 ML | Refills: 3 | Status: SHIPPED | OUTPATIENT
Start: 2024-10-15 | End: 2024-10-16 | Stop reason: SDUPTHER

## 2024-10-15 RX ORDER — TIRZEPATIDE 5 MG/.5ML
5 INJECTION, SOLUTION SUBCUTANEOUS
Qty: 2 ML | Refills: 3 | Status: SHIPPED | OUTPATIENT
Start: 2024-10-15 | End: 2024-10-15 | Stop reason: SDUPTHER

## 2024-10-15 RX ORDER — DEXTROAMPHETAMINE SACCHARATE, AMPHETAMINE ASPARTATE MONOHYDRATE, DEXTROAMPHETAMINE SULFATE AND AMPHETAMINE SULFATE 2.5; 2.5; 2.5; 2.5 MG/1; MG/1; MG/1; MG/1
10 CAPSULE, EXTENDED RELEASE ORAL EVERY MORNING
Qty: 30 CAPSULE | Refills: 0 | Status: SHIPPED | OUTPATIENT
Start: 2024-10-15 | End: 2024-10-18 | Stop reason: SDUPTHER

## 2024-10-16 ENCOUNTER — PATIENT MESSAGE (OUTPATIENT)
Dept: FAMILY MEDICINE | Facility: CLINIC | Age: 49
End: 2024-10-16
Payer: COMMERCIAL

## 2024-10-16 DIAGNOSIS — E66.01 CLASS 3 SEVERE OBESITY DUE TO EXCESS CALORIES WITH SERIOUS COMORBIDITY AND BODY MASS INDEX (BMI) OF 40.0 TO 44.9 IN ADULT: ICD-10-CM

## 2024-10-16 DIAGNOSIS — E66.813 CLASS 3 SEVERE OBESITY DUE TO EXCESS CALORIES WITH SERIOUS COMORBIDITY AND BODY MASS INDEX (BMI) OF 40.0 TO 44.9 IN ADULT: ICD-10-CM

## 2024-10-16 RX ORDER — TIRZEPATIDE 5 MG/.5ML
5 INJECTION, SOLUTION SUBCUTANEOUS
Qty: 2 ML | Refills: 3 | Status: SHIPPED | OUTPATIENT
Start: 2024-10-16 | End: 2024-10-18 | Stop reason: SDUPTHER

## 2024-10-17 DIAGNOSIS — F90.9 ATTENTION DEFICIT HYPERACTIVITY DISORDER (ADHD), UNSPECIFIED ADHD TYPE: ICD-10-CM

## 2024-10-18 DIAGNOSIS — E66.01 CLASS 3 SEVERE OBESITY DUE TO EXCESS CALORIES WITH SERIOUS COMORBIDITY AND BODY MASS INDEX (BMI) OF 40.0 TO 44.9 IN ADULT: ICD-10-CM

## 2024-10-18 DIAGNOSIS — E66.813 CLASS 3 SEVERE OBESITY DUE TO EXCESS CALORIES WITH SERIOUS COMORBIDITY AND BODY MASS INDEX (BMI) OF 40.0 TO 44.9 IN ADULT: ICD-10-CM

## 2024-10-18 DIAGNOSIS — F90.9 ATTENTION DEFICIT HYPERACTIVITY DISORDER (ADHD), UNSPECIFIED ADHD TYPE: ICD-10-CM

## 2024-10-18 RX ORDER — TIRZEPATIDE 5 MG/.5ML
5 INJECTION, SOLUTION SUBCUTANEOUS
Qty: 2 ML | Refills: 3 | Status: SHIPPED | OUTPATIENT
Start: 2024-10-18

## 2024-10-18 RX ORDER — DEXTROAMPHETAMINE SACCHARATE, AMPHETAMINE ASPARTATE MONOHYDRATE, DEXTROAMPHETAMINE SULFATE AND AMPHETAMINE SULFATE 2.5; 2.5; 2.5; 2.5 MG/1; MG/1; MG/1; MG/1
10 CAPSULE, EXTENDED RELEASE ORAL EVERY MORNING
Qty: 30 CAPSULE | Refills: 0 | Status: SHIPPED | OUTPATIENT
Start: 2024-10-18

## 2024-10-18 RX ORDER — DEXTROAMPHETAMINE SACCHARATE, AMPHETAMINE ASPARTATE MONOHYDRATE, DEXTROAMPHETAMINE SULFATE AND AMPHETAMINE SULFATE 2.5; 2.5; 2.5; 2.5 MG/1; MG/1; MG/1; MG/1
10 CAPSULE, EXTENDED RELEASE ORAL EVERY MORNING
Qty: 30 CAPSULE | Refills: 0 | OUTPATIENT
Start: 2024-10-18

## 2024-10-18 NOTE — TELEPHONE ENCOUNTER
APPT. 11/18/24, LOV. 9/16/24.    Patient wants these medications sent to [facility] pharmacy.

## 2024-11-15 ENCOUNTER — OFFICE VISIT (OUTPATIENT)
Dept: FAMILY MEDICINE | Facility: CLINIC | Age: 49
End: 2024-11-15
Payer: COMMERCIAL

## 2024-11-15 VITALS
SYSTOLIC BLOOD PRESSURE: 118 MMHG | DIASTOLIC BLOOD PRESSURE: 82 MMHG | HEART RATE: 87 BPM | WEIGHT: 306 LBS | HEIGHT: 75 IN | BODY MASS INDEX: 38.05 KG/M2 | TEMPERATURE: 98 F | OXYGEN SATURATION: 97 %

## 2024-11-15 DIAGNOSIS — E66.812 CLASS 2 SEVERE OBESITY DUE TO EXCESS CALORIES WITH SERIOUS COMORBIDITY AND BODY MASS INDEX (BMI) OF 38.0 TO 38.9 IN ADULT: ICD-10-CM

## 2024-11-15 DIAGNOSIS — K52.9 GASTROENTERITIS: ICD-10-CM

## 2024-11-15 DIAGNOSIS — K21.9 GASTROESOPHAGEAL REFLUX DISEASE, UNSPECIFIED WHETHER ESOPHAGITIS PRESENT: ICD-10-CM

## 2024-11-15 DIAGNOSIS — E66.01 CLASS 2 SEVERE OBESITY DUE TO EXCESS CALORIES WITH SERIOUS COMORBIDITY AND BODY MASS INDEX (BMI) OF 38.0 TO 38.9 IN ADULT: ICD-10-CM

## 2024-11-15 DIAGNOSIS — F90.9 ATTENTION DEFICIT HYPERACTIVITY DISORDER (ADHD), UNSPECIFIED ADHD TYPE: ICD-10-CM

## 2024-11-15 DIAGNOSIS — K42.9 UMBILICAL HERNIA WITHOUT OBSTRUCTION AND WITHOUT GANGRENE: ICD-10-CM

## 2024-11-15 DIAGNOSIS — Z76.89 ENCOUNTER FOR WEIGHT MANAGEMENT: Primary | ICD-10-CM

## 2024-11-15 DIAGNOSIS — I10 ESSENTIAL HYPERTENSION: ICD-10-CM

## 2024-11-15 PROCEDURE — 99999 PR PBB SHADOW E&M-EST. PATIENT-LVL III: CPT | Mod: PBBFAC,,, | Performed by: NURSE PRACTITIONER

## 2024-11-15 RX ORDER — DEXTROAMPHETAMINE SACCHARATE, AMPHETAMINE ASPARTATE MONOHYDRATE, DEXTROAMPHETAMINE SULFATE AND AMPHETAMINE SULFATE 3.75; 3.75; 3.75; 3.75 MG/1; MG/1; MG/1; MG/1
15 CAPSULE, EXTENDED RELEASE ORAL EVERY MORNING
Qty: 30 CAPSULE | Refills: 0 | Status: SHIPPED | OUTPATIENT
Start: 2024-11-15

## 2024-11-15 RX ORDER — PANTOPRAZOLE SODIUM 40 MG/1
40 TABLET, DELAYED RELEASE ORAL DAILY
Qty: 90 TABLET | Refills: 1 | Status: SHIPPED | OUTPATIENT
Start: 2024-11-15

## 2024-11-15 NOTE — PROGRESS NOTES
SUBJECTIVE:      Patient ID: Joo Roman is a 48 y.o. male.    Chief Complaint: Follow-up, Diarrhea (2 days since Wednesday ), and Gastroesophageal Reflux    History of Present Illness    CHIEF COMPLAINT:  Mr. Roman presents with persistent diarrhea since Wednesday evening and requests an increase in Adderall dosage.    HPI:  Mr. Roman reports diarrhea since Wednesday evening, possibly due to food poisoning from a fast food restaurant. The stool is liquid and watery, less substantial than typical diarrhea. He had 5-6 bowel movements on Wednesday night, 4-5 last night, and 3-4 on the morning of the visit. He took Pepto-Bismol on Thursday night and resisted using the bathroom during the day. Mr. Roman denies recent travel or similar illness in family members. He reports some gas and significant relief after a large eructation when lying on his left side. He denies abdominal pain, blood in stool, nausea, and vomiting. Mr. Roman has increased fluid intake to combat the diarrhea.    Mr. Roman reports severe acid reflux for the past 2 weeks, possibly a side effect of Zepbound, managed effectively with OTC acid medication.    Mr. Roman feels lethargic midday and requests an increase in his Adderall dosage from the current 10mg extended release. He reports diminished effects from Adderall and reduced energy levels compared to initial use.    Mr. Roman has lost 16 lbs since September, attributed to increased energy from Adderall leading to more movement at work, and reduced portion sizes due to feeling ill if he eats too much.    Mr. Roman denies chest pain and palpitations. Mr. Roman denies currently having sleep apnea or snoring.    MEDICATIONS:  Mr. Roman is on Zepbound (tirzepatide) 5 mg weekly and Adderall XR 10 mg extended release. He is also taking Lisinopril/hydrochlorothiazide. Mr. Roman uses OTC acid medicine and Pepto-Bismol as needed for diarrhea.    MEDICAL HISTORY:  Mr. Roman has a history of sleep apnea diagnosed  13-14 years ago. He recently developed acid reflux. Mr. Roman has two umbilical hernias and a history of hypertension.    SURGICAL HISTORY:  Mr. Roman underwent sleep apnea surgery 13-14 years ago. He has had three previous surgeries to repair the larger of his umbilical hernias.    TEST RESULTS:  Mr. Roman underwent a sleep study 13-14 years ago, which resulted in a diagnosis of sleep apnea. He had surgery to correct this and has not ad an issue since.         Review of Systems   Constitutional:  Negative for activity change, appetite change, chills, diaphoresis, fatigue, fever and unexpected weight change.   HENT:  Negative for congestion, ear pain, sinus pressure, sore throat, trouble swallowing and voice change.    Eyes:  Negative for pain, discharge and visual disturbance.   Respiratory:  Negative for cough, chest tightness, shortness of breath and wheezing.    Cardiovascular:  Negative for chest pain and palpitations.   Gastrointestinal:  Positive for diarrhea. Negative for abdominal pain, constipation, nausea and vomiting.        GERD   Genitourinary:  Negative for difficulty urinating, flank pain, frequency and urgency.   Musculoskeletal:  Negative for back pain and joint swelling.   Skin:  Negative for color change and rash.   Neurological:  Negative for dizziness, seizures, syncope, weakness, numbness and headaches.   Hematological:  Negative for adenopathy.   Psychiatric/Behavioral:  Negative for dysphoric mood and sleep disturbance. The patient is not nervous/anxious.        Family History   Problem Relation Name Age of Onset    Thyroid disease Mother      Heart disease Father        Social History     Socioeconomic History    Marital status:    Occupational History    Occupation:      Comment: [employer]   Tobacco Use    Smoking status: Never    Smokeless tobacco: Never   Substance and Sexual Activity    Alcohol use: Yes     Alcohol/week: 1.0 - 2.0 standard drink of alcohol      Types: 1 - 2 Shots of liquor per week    Drug use: Never    Sexual activity: Yes     Partners: Female     Birth control/protection: None     Social Drivers of Health     Financial Resource Strain: Low Risk  (8/16/2024)    Overall Financial Resource Strain (CARDIA)     Difficulty of Paying Living Expenses: Not hard at all   Food Insecurity: No Food Insecurity (8/16/2024)    Hunger Vital Sign     Worried About Running Out of Food in the Last Year: Never true     Ran Out of Food in the Last Year: Never true   Physical Activity: Unknown (8/16/2024)    Exercise Vital Sign     Days of Exercise per Week: 0 days   Stress: Stress Concern Present (8/16/2024)    [employer] - Occupational Stress Questionnaire     Feeling of Stress : To some extent   Housing Stability: Unknown (8/16/2024)    Housing Stability Vital Sign     Unable to Pay for Housing in the Last Year: No     Current Outpatient Medications   Medication Sig Dispense Refill    ALPRAZolam (XANAX) 0.5 MG tablet Take 1 tablet (0.5 mg total) by mouth 2 (two) times daily as needed for Anxiety (Flight anxiety). 20 tablet 0    lisinopriL-hydrochlorothiazide (PRINZIDE,ZESTORETIC) 10-12.5 mg per tablet Take 1 tablet by mouth once daily. 90 tablet 1    tirzepatide, weight loss, (ZEPBOUND) 5 mg/0.5 mL PnIj Inject 5 mg into the skin every 7 days. 2 mL 3    dextroamphetamine-amphetamine (ADDERALL XR) 15 MG 24 hr capsule Take 1 capsule (15 mg total) by mouth every morning. 30 capsule 0    pantoprazole (PROTONIX) 40 MG tablet Take 1 tablet (40 mg total) by mouth once daily. 90 tablet 1     No current facility-administered medications for this visit.     Review of patient's allergies indicates:  No Known Allergies   History reviewed. No pertinent past medical history.  Past Surgical History:   Procedure Laterality Date    ADENOIDECTOMY  2008    BALLOON SINUPLASTY OF PARANASAL SINUS  2008    BICEPS TENDON REPAIR Right 2015    Dr Garcia    HERNIA REPAIR  "     x3, 2000, mesh repair 2006? & 2008    TENDON REPAIR Left 2011    [place], Dr Brown    TONSILLECTOMY  2008          OBJECTIVE:      Vitals:    11/15/24 0931   BP: 118/82   BP Location: Left arm   Patient Position: Sitting   Pulse: 87   Temp: 97.9 °F (36.6 °C)   TempSrc: Oral   SpO2: 97%   Weight: (!) 138.8 kg (306 lb)   Height: 6' 3" (1.905 m)     Physical Exam  Vitals and nursing note reviewed.   Constitutional:       General: He is awake. He is not in acute distress.     Appearance: He is well-developed and well-groomed. He is obese. He is not ill-appearing, toxic-appearing or diaphoretic.   HENT:      Head: Normocephalic and atraumatic.      Nose: Nose normal.   Eyes:      General: Lids are normal. Gaze aligned appropriately.      Conjunctiva/sclera: Conjunctivae normal.      Right eye: Right conjunctiva is not injected.      Left eye: Left conjunctiva is not injected.      Pupils: Pupils are equal, round, and reactive to light.   Cardiovascular:      Rate and Rhythm: Normal rate and regular rhythm.      Pulses: Normal pulses.      Heart sounds: Normal heart sounds, S1 normal and S2 normal. No murmur heard.     No friction rub. No gallop.   Pulmonary:      Effort: Pulmonary effort is normal. No respiratory distress.      Breath sounds: Normal breath sounds. No stridor. No decreased breath sounds, wheezing, rhonchi or rales.   Chest:      Chest wall: No tenderness.   Abdominal:      Hernia: A hernia is present. Hernia is present in the umbilical area.      Comments: Umbilical hernia is soft and reducible.   Musculoskeletal:      Cervical back: Neck supple.      Right lower leg: No edema.      Left lower leg: No edema.   Lymphadenopathy:      Cervical: No cervical adenopathy.   Skin:     General: Skin is warm and dry.      Capillary Refill: Capillary refill takes less than 2 seconds.      Findings: No erythema or rash.   Neurological:      Mental Status: He is alert and oriented to person, place, and time. " Mental status is at baseline.   Psychiatric:         Attention and Perception: Attention normal.         Mood and Affect: Mood normal.         Speech: Speech normal.         Behavior: Behavior normal. Behavior is cooperative.         Thought Content: Thought content normal.         Judgment: Judgment normal.       Office Visit on 09/16/2024   Component Date Value Ref Range Status    Cologuard Result 10/21/2024 Negative  Negative Final    Comment:   NEGATIVE TEST RESULT. A negative Cologuard result indicates a low likelihood that a colorectal cancer (CRC) or advanced adenoma (adenomatous polyps with more advanced pre-malignant features)  is present. The chance that a person with a negative Cologuard test has a colorectal cancer is less than 1 in 1500 (negative predictive value >99.9%) or has an  advanced adenoma is less than  5.3% (negative predictive value 94.7%). These data are based on a prospective cross-sectional study of 10,000 individuals at average risk for colorectal cancer who were screened with both Cologuard and colonoscopy. (Chato WANG et al, N Engl J Med 2014;370(14):4270-4538) The normal value (reference range) for this assay is negative.    COLOGUARD RE-SCREENING RECOMMENDATION: Periodic colorectal cancer screening is an important part of preventive healthcare for asymptomatic individuals at average risk for colorectal cancer.  Following a negative Cologuard result, the American Cancer Society and U.S.                            Multi-Society Task Force screening guidelines recommend a Cologuard re-screening interval of 3 years.   References: American Cancer Society Guideline for Colorectal Cancer Screening: https://www.cancer.org/cancer/colon-rectal-cancer/acqrocpgs-kytfimljy-kvijegq/acs-recommendations.html.; Mayank LOWE, Qian ZHENG, Tamra FELIPE, Colorectal Cancer Screening: Recommendations for Physicians and Patients from the U.S. Multi-Society Task Force on Colorectal Cancer Screening , Am J  Gastroenterology 2017; 112:5919-1167.    TEST DESCRIPTION: Composite algorithmic analysis of stool DNA-biomarkers with hemoglobin immunoassay.   Quantitative values of individual biomarkers are not reportable and are not associated with individual biomarker result reference ranges. Cologuard is intended for colorectal cancer screening of adults of either sex, 45 years or older, who are at average-risk for colorectal cancer (CRC). Cologuard has been approved for use by the U.S. FDA. The performance of Cologuard was                            established in a cross sectional study of average-risk adults aged 50-84. Cologuard performance in patients ages 45 to 49 years was estimated by sub-group analysis of near-age groups. Colonoscopies performed for a positive result may find as the most clinically significant lesion: colorectal cancer [4.0%], advanced adenoma (including sessile serrated polyps greater than or equal to 1cm diameter) [20%] or non- advanced adenoma [31%]; or no colorectal neoplasia [45%]. These estimates are derived from a prospective cross-sectional screening study of 10,000 individuals at average risk for colorectal cancer who were screened with both Cologuard and colonoscopy. (Chato Kay al, N Engl J Med 2014;370(14):5237-9812.) Cologuard may produce a false negative or false positive result (no colorectal cancer or precancerous polyp present at colonoscopy follow up). A negative Cologuard test result does not guarantee the absence of CRC or advanced adenoma (pre-cancer). The current Cologuard                            screening interval is every 3 years. (American Cancer Society and U.S. Multi-Society Task Force). Cologuard performance data in a 10,000 patient pivotal study using colonoscopy as the reference method can be accessed at the following location: www.[employer].com/results. Additional description of the Cologuard test process, warnings and precautions can be found at  www.[employer].     Office Visit on 08/16/2024   Component Date Value Ref Range Status    Glucose 08/19/2024 104 (H)  70 - 99 mg/dL Final    BUN 08/19/2024 11  6 - 24 mg/dL Final    Creatinine 08/19/2024 0.99  0.76 - 1.27 mg/dL Final    eGFR 08/19/2024 95  >59 mL/min/1.73 Final    BUN/Creatinine Ratio 08/19/2024 11  9 - 20 Final    Sodium 08/19/2024 139  134 - 144 mmol/L Final    Potassium 08/19/2024 4.5  3.5 - 5.2 mmol/L Final    Chloride 08/19/2024 101  96 - 106 mmol/L Final    CO2 08/19/2024 26  20 - 29 mmol/L Final    Calcium 08/19/2024 9.2  8.7 - 10.2 mg/dL Final    Protein, Total 08/19/2024 6.5  6.0 - 8.5 g/dL Final    Albumin 08/19/2024 4.0 (L)  4.1 - 5.1 g/dL Final    Globulin, Total 08/19/2024 2.5  1.5 - 4.5 g/dL Final    Total Bilirubin 08/19/2024 0.7  0.0 - 1.2 mg/dL Final    Alkaline Phosphatase 08/19/2024 74  44 - 121 IU/L Final    AST 08/19/2024 15  0 - 40 IU/L Final    ALT 08/19/2024 17  0 - 44 IU/L Final    Cholesterol 08/19/2024 155  100 - 199 mg/dL Final    Triglycerides 08/19/2024 81  0 - 149 mg/dL Final    HDL 08/19/2024 37 (L)  >39 mg/dL Final    VLDL Cholesterol Lalit 08/19/2024 16  5 - 40 mg/dL Final    LDL Calculated 08/19/2024 102 (H)  0 - 99 mg/dL Final    TSH 08/19/2024 2.230  0.450 - 4.500 uIU/mL Final    Creatinine, Urine 08/19/2024 301.7  Not Estab. mg/dL Final    Microalb, Ur 08/19/2024 8.3  Not Estab. ug/mL Final    Microalb/Crt. Ratio 08/19/2024 3  0 - 29 mg/g creat Final    Comment:                        Normal:                0 -  29                         Moderately increased: 30 - 300                         Severely increased:       >300      Hemoglobin A1c 08/19/2024 5.2  4.8 - 5.6 % Final    Comment:          Prediabetes: 5.7 - 6.4           Diabetes: >6.4           Glycemic control for adults with diabetes: <7.0      Testosterone 08/19/2024 300  264 - 916 ng/dL Final    Comment: Adult male reference interval is based on a population of  healthy nonobese males (BMI <30)  between 19 and 39 years old.  marquita Schmitt.al. JCEM 2017,102;2731-3567. PMID: [id number].          Assessment:       1. Encounter for weight management    2. Class 2 severe obesity due to excess calories with serious comorbidity and body mass index (BMI) of 38.0 to 38.9 in adult    3. Gastroesophageal reflux disease, unspecified whether esophagitis present    4. Diarrhea, unspecified type    5. Essential hypertension    6. Umbilical hernia without obstruction and without gangrene    7. Attention deficit hyperactivity disorder (ADHD), unspecified ADHD type        Plan:       Assessment & Plan    Assessed diarrhea as likely food-borne, self-limiting  Considered infectious causes unlikely given no recent travel or antibiotic use  Evaluated Zepbound tolerance, noting acid reflux as side effect  Reviewed weight loss progress, down 16 lbs since September  Assessed Adderall efficacy and considered dose increase  Confirmed good blood pressure control on current medication  Evaluated umbilical hernias, noting they are soft and reducible    GASTROENTERITIS:  Explained typical course of food-borne diarrhea.  Discussed rationale for allowing diarrhea to pass through rather than using anti-diarrheal medications in some cases.  Mr. Roman to increase fluid intake to combat diarrhea.  Recommend starting bland diet and advancing as tolerated.  Mr. Roman can consider taking a probiotic to reintroduce good gut marjorie.  Contact the office if diarrhea persists over the weekend.  Follow up on Monday for stool cultures if symptoms do not improve.    GASTRO-ESOPHAGEAL REFLUX DISEASE:  Started Protonix for acid reflux.    ATTENTION-DEFICIT HYPERACTIVITY DISORDER:  Increased Adderall XR from 10 mg to 15 mg daily.    ESSENTIAL HYPERTENSION:  Continued lisinopril/hydrochlorothiazide tablet at current dose.    WEIGHT MANAGEMENT:  Recommend increasing physical activity and exercise to support weight loss efforts.  Excessive time spent in sedentary  behavior, such as sitting, is associated with deleterious health outcomes including abnormal glucose metabolism and increased mortality.   Reducing sedentary time, independent of physical activity, has known cardiovascular, metabolic, and functional benefits in all adults.   Any amount of exercise is better than being sedentary.  Continued Mounjaro at current dose.    FOLLOW-UP AND ADMINISTRATIVE:  Advised on potential causes of sudden medication cost increases, such as changes in prescription quantity or insurance deductibles.  Contact the office regarding any findings about medication cost increases.        Encounter for weight management    Class 2 severe obesity due to excess calories with serious comorbidity and body mass index (BMI) of 38.0 to 38.9 in adult    Gastroesophageal reflux disease, unspecified whether esophagitis present  -     pantoprazole (PROTONIX) 40 MG tablet; Take 1 tablet (40 mg total) by mouth once daily.  Dispense: 90 tablet; Refill: 1    Diarrhea, unspecified type    Essential hypertension    Umbilical hernia without obstruction and without gangrene    Attention deficit hyperactivity disorder (ADHD), unspecified ADHD type  -     dextroamphetamine-amphetamine (ADDERALL XR) 15 MG 24 hr capsule; Take 1 capsule (15 mg total) by mouth every morning.  Dispense: 30 capsule; Refill: 0    I spent a total of 21 minutes on the day of the visit.This includes face to face time and non-face to face time preparing to see the patient (eg, review of tests), obtaining and/or reviewing separately obtained history, documenting clinical information in the electronic or other health record, independently interpreting results and communicating results to the patient/family/caregiver, or care coordinator.    Follow up in about 3 months (around 2/15/2025) for ADHD and weight check.          11/15/2024 RICARDO Ruby, BILLP    This note was generated with the assistance of ambient listening technology.  Verbal consent was obtained by the patient and accompanying visitor(s) for the recording of patient appointment to facilitate this note. I attest to having reviewed and edited the generated note for accuracy, though some syntax or spelling errors may persist. Please contact the author of this note for any clarification.

## 2024-12-19 DIAGNOSIS — F90.9 ATTENTION DEFICIT HYPERACTIVITY DISORDER (ADHD), UNSPECIFIED ADHD TYPE: ICD-10-CM

## 2024-12-19 RX ORDER — DEXTROAMPHETAMINE SACCHARATE, AMPHETAMINE ASPARTATE MONOHYDRATE, DEXTROAMPHETAMINE SULFATE AND AMPHETAMINE SULFATE 3.75; 3.75; 3.75; 3.75 MG/1; MG/1; MG/1; MG/1
15 CAPSULE, EXTENDED RELEASE ORAL EVERY MORNING
Qty: 30 CAPSULE | Refills: 0 | Status: SHIPPED | OUTPATIENT
Start: 2024-12-19

## 2025-01-20 DIAGNOSIS — F90.9 ATTENTION DEFICIT HYPERACTIVITY DISORDER (ADHD), UNSPECIFIED ADHD TYPE: ICD-10-CM

## 2025-01-23 RX ORDER — DEXTROAMPHETAMINE SACCHARATE, AMPHETAMINE ASPARTATE MONOHYDRATE, DEXTROAMPHETAMINE SULFATE AND AMPHETAMINE SULFATE 3.75; 3.75; 3.75; 3.75 MG/1; MG/1; MG/1; MG/1
15 CAPSULE, EXTENDED RELEASE ORAL EVERY MORNING
Qty: 30 CAPSULE | Refills: 0 | Status: SHIPPED | OUTPATIENT
Start: 2025-01-23

## 2025-02-14 ENCOUNTER — OFFICE VISIT (OUTPATIENT)
Dept: FAMILY MEDICINE | Facility: CLINIC | Age: 50
End: 2025-02-14
Payer: COMMERCIAL

## 2025-02-14 VITALS
WEIGHT: 314.63 LBS | TEMPERATURE: 98 F | BODY MASS INDEX: 39.12 KG/M2 | HEART RATE: 85 BPM | SYSTOLIC BLOOD PRESSURE: 118 MMHG | OXYGEN SATURATION: 94 % | DIASTOLIC BLOOD PRESSURE: 88 MMHG | HEIGHT: 75 IN

## 2025-02-14 DIAGNOSIS — Z76.89 ENCOUNTER FOR WEIGHT MANAGEMENT: ICD-10-CM

## 2025-02-14 DIAGNOSIS — E66.812 CLASS 2 SEVERE OBESITY DUE TO EXCESS CALORIES WITH SERIOUS COMORBIDITY AND BODY MASS INDEX (BMI) OF 39.0 TO 39.9 IN ADULT: ICD-10-CM

## 2025-02-14 DIAGNOSIS — K57.92 DIVERTICULITIS: Primary | ICD-10-CM

## 2025-02-14 DIAGNOSIS — K42.9 UMBILICAL HERNIA WITHOUT OBSTRUCTION AND WITHOUT GANGRENE: ICD-10-CM

## 2025-02-14 DIAGNOSIS — F90.9 ATTENTION DEFICIT HYPERACTIVITY DISORDER (ADHD), UNSPECIFIED ADHD TYPE: ICD-10-CM

## 2025-02-14 DIAGNOSIS — E78.49 OTHER HYPERLIPIDEMIA: ICD-10-CM

## 2025-02-14 DIAGNOSIS — R10.32 LLQ PAIN: ICD-10-CM

## 2025-02-14 DIAGNOSIS — I10 ESSENTIAL HYPERTENSION: ICD-10-CM

## 2025-02-14 DIAGNOSIS — E66.01 CLASS 2 SEVERE OBESITY DUE TO EXCESS CALORIES WITH SERIOUS COMORBIDITY AND BODY MASS INDEX (BMI) OF 39.0 TO 39.9 IN ADULT: ICD-10-CM

## 2025-02-14 PROCEDURE — 99999 PR PBB SHADOW E&M-EST. PATIENT-LVL IV: CPT | Mod: PBBFAC,,, | Performed by: NURSE PRACTITIONER

## 2025-02-14 RX ORDER — TRAMADOL HYDROCHLORIDE 50 MG/1
50 TABLET ORAL EVERY 6 HOURS PRN
Qty: 12 EACH | Refills: 0 | Status: SHIPPED | OUTPATIENT
Start: 2025-02-14

## 2025-02-14 RX ORDER — SEMAGLUTIDE 0.5 MG/.5ML
0.5 INJECTION, SOLUTION SUBCUTANEOUS
Qty: 2 ML | Refills: 3 | Status: SHIPPED | OUTPATIENT
Start: 2025-02-14

## 2025-02-14 RX ORDER — AMOXICILLIN AND CLAVULANATE POTASSIUM 875; 125 MG/1; MG/1
1 TABLET, FILM COATED ORAL EVERY 12 HOURS
Qty: 14 TABLET | Refills: 0 | Status: SHIPPED | OUTPATIENT
Start: 2025-02-14 | End: 2025-02-21

## 2025-02-14 RX ORDER — DEXTROAMPHETAMINE SACCHARATE, AMPHETAMINE ASPARTATE MONOHYDRATE, DEXTROAMPHETAMINE SULFATE AND AMPHETAMINE SULFATE 5; 5; 5; 5 MG/1; MG/1; MG/1; MG/1
20 CAPSULE, EXTENDED RELEASE ORAL EVERY MORNING
Qty: 30 CAPSULE | Refills: 0 | Status: SHIPPED | OUTPATIENT
Start: 2025-02-14

## 2025-02-14 NOTE — PROGRESS NOTES
SUBJECTIVE:      Patient ID: Joo Roman is a 49 y.o. male.    Chief Complaint: Follow-up (3 mon f/u)    History of Present Illness    CHIEF COMPLAINT:  Mr. Roman presents with a flare-up of suspected diverticulitis, experiencing discomfort in the left lower quadrant since yesterday afternoon.    HPI:  Mr. Roman reports a flare-up of suspected diverticulitis since yesterday afternoon, around 5 or 6 PM. He describes the sensation as very uncomfortable and annoying, comparing it to a pulled muscle, localized in the left lower quadrant of his abdomen. Last night, he attempted to use the bathroom and had diaphoresis. He then reclined and massaged the area, which led to some alleviation of the discomfort, allowing him to sleep. The discomfort persisted upon waking.    This is the first significant flare-up in about a year, though he cannot recall the exact timing of the last episode. He typically associates flare-ups with consuming popcorn husks, which he has not eaten in 6-7 years. The only potential trigger he can identify for this current episode is possibly consuming excessive cracked black pepper.    He reports having a high pain tolerance but finds it difficult to quantify his discomfort on a pain scale. Deep inspiration causes some pain. He notes the presence of an umbilical hernia, describing palpable protrusions, and occasionally has acute, sharp pains in that area.    His history includes a previous barium study due to suspected intestinal blockage, though he does not remember the specific doctor he saw for this.    He denies fever, nausea, vomiting, blood in stool, chest pain, and palpitations.    MEDICATIONS:  Mr. Roman is on Lisinopril-hydrochlorothiazide 10-12.5 mg for blood pressure control and Adderall 15 mg for focus and concentration. He has discontinued Zepbound (tirzepatide) 5 mg, which was previously used for weight loss due to cost. His Adderall dosage was increased from 10 mg to 15 mg in the  past.    MEDICAL HISTORY:  Mr. Roman has a history of diverticulitis, with the last significant flare occurring approximately 1 year ago. He also has a history of obesity and umbilical hernia.    TEST RESULTS:  Mr. Roman completed a Cologuard last year, but was unaware of the diverticulitis history at that time, otherwise would have advised patient to get a colonoscopy.     IMAGING:  He underwent a barium study at some point in the past for suspected intestinal blockage.        Review of Systems   Constitutional:  Negative for activity change, appetite change, chills, diaphoresis, fatigue, fever and unexpected weight change.   HENT:  Negative for congestion, ear pain, sinus pressure, sore throat, trouble swallowing and voice change.    Eyes:  Negative for pain, discharge and visual disturbance.   Respiratory:  Negative for cough, chest tightness, shortness of breath and wheezing.    Cardiovascular:  Negative for chest pain and palpitations.   Gastrointestinal:  Negative for abdominal pain, constipation, diarrhea, nausea and vomiting.   Endocrine: Negative for polydipsia, polyphagia and polyuria.   Genitourinary:  Negative for difficulty urinating, flank pain, frequency and urgency.   Musculoskeletal:  Negative for back pain and joint swelling.   Skin:  Negative for color change and rash.   Neurological:  Negative for dizziness, seizures, syncope, weakness, numbness and headaches.   Hematological:  Negative for adenopathy.   Psychiatric/Behavioral:  Negative for dysphoric mood and sleep disturbance. The patient is not nervous/anxious.        Family History   Problem Relation Name Age of Onset    Thyroid disease Mother      Heart disease Father        Social History     Socioeconomic History    Marital status:    Occupational History    Occupation:      Comment: [employer]   Tobacco Use    Smoking status: Never    Smokeless tobacco: Never   Substance and Sexual Activity    Alcohol use: Yes      Alcohol/week: 1.0 - 2.0 standard drink of alcohol     Types: 1 - 2 Shots of liquor per week    Drug use: Never    Sexual activity: Yes     Partners: Female     Birth control/protection: None     Social Drivers of Health     Financial Resource Strain: Low Risk  (2/14/2025)    Overall Financial Resource Strain (CARDIA)     Difficulty of Paying Living Expenses: Not hard at all   Food Insecurity: No Food Insecurity (2/14/2025)    Hunger Vital Sign     Worried About Running Out of Food in the Last Year: Never true     Ran Out of Food in the Last Year: Never true   Transportation Needs: No Transportation Needs (2/14/2025)    PRAPARE - Transportation     Lack of Transportation (Medical): No     Lack of Transportation (Non-Medical): No   Physical Activity: Insufficiently Active (2/14/2025)    Exercise Vital Sign     Days of Exercise per Week: 2 days     Minutes of Exercise per Session: 20 min   Stress: No Stress Concern Present (2/14/2025)    [employer] - Occupational Stress Questionnaire     Feeling of Stress : Only a little   Housing Stability: Low Risk  (2/14/2025)    Housing Stability Vital Sign     Unable to Pay for Housing in the Last Year: No     Number of Times Moved in the Last Year: 0     Homeless in the Last Year: No     Current Outpatient Medications   Medication Sig Dispense Refill    ALPRAZolam (XANAX) 0.5 MG tablet Take 1 tablet (0.5 mg total) by mouth 2 (two) times daily as needed for Anxiety (Flight anxiety). 20 tablet 0    lisinopriL-hydrochlorothiazide (PRINZIDE,ZESTORETIC) 10-12.5 mg per tablet Take 1 tablet by mouth once daily. 90 tablet 1    pantoprazole (PROTONIX) 40 MG tablet Take 1 tablet (40 mg total) by mouth once daily. 90 tablet 1    amoxicillin-clavulanate 875-125mg (AUGMENTIN) 875-125 mg per tablet Take 1 tablet by mouth every 12 (twelve) hours.for 7 days 14 tablet 0    dextroamphetamine-amphetamine (ADDERALL XR) 20 MG 24 hr capsule Take 1 capsule (20 mg  "total) by mouth every morning. 30 capsule 0    semaglutide, weight loss, (WEGOVY) 0.5 mg/0.5 mL PnIj Inject 0.5 mg into the skin every 7 days. 2 mL 3    traMADoL (ULTRAM) 50 mg tablet Take 1 tablet (50 mg total) by mouth every 6 (six) hours as needed for Pain. 12 each 0     No current facility-administered medications for this visit.     Review of patient's allergies indicates:  No Known Allergies   No past medical history on file.  Past Surgical History:   Procedure Laterality Date    ADENOIDECTOMY  2008    BALLOON SINUPLASTY OF PARANASAL SINUS  2008    BICEPS TENDON REPAIR Right 2015    Dr Garcia    HERNIA REPAIR      x3, 2000, mesh repair 2006? & 2008    TENDON REPAIR Left 2011    ankle, Dr Brown    TONSILLECTOMY  2008          OBJECTIVE:      Vitals:    02/14/25 0912   BP: 118/88   BP Location: Left arm   Patient Position: Sitting   Pulse: 85   Temp: 98 °F (36.7 °C)   TempSrc: Oral   SpO2: (!) 94%   Weight: (!) 142.7 kg (314 lb 9.6 oz)   Height: 6' 3" (1.905 m)     Physical Exam  Vitals and nursing note reviewed.   Constitutional:       General: He is awake. He is not in acute distress.     Appearance: He is well-developed and well-groomed. He is obese. He is not ill-appearing, toxic-appearing or diaphoretic.   HENT:      Head: Normocephalic and atraumatic.      Nose: Nose normal.   Eyes:      General: Lids are normal. Gaze aligned appropriately.      Conjunctiva/sclera: Conjunctivae normal.      Right eye: Right conjunctiva is not injected.      Left eye: Left conjunctiva is not injected.      Pupils: Pupils are equal, round, and reactive to light.   Cardiovascular:      Rate and Rhythm: Normal rate and regular rhythm.      Pulses: Normal pulses.      Heart sounds: Normal heart sounds, S1 normal and S2 normal. No murmur heard.     No friction rub. No gallop.   Pulmonary:      Effort: Pulmonary effort is normal. No respiratory distress.      Breath sounds: Normal breath sounds. No stridor. No decreased breath " sounds, wheezing, rhonchi or rales.   Chest:      Chest wall: No tenderness.   Abdominal:      Palpations: Abdomen is soft.      Tenderness: There is abdominal tenderness in the left lower quadrant. There is no guarding or rebound.      Hernia: A hernia is present. Hernia is present in the umbilical area.      Comments: Umbilical hernia x2 noted, both soft and reducible. Tenderness noted to LLQ with palpation.    Musculoskeletal:      Cervical back: Neck supple.      Right lower leg: No edema.      Left lower leg: No edema.   Lymphadenopathy:      Cervical: No cervical adenopathy.   Skin:     General: Skin is warm and dry.      Capillary Refill: Capillary refill takes less than 2 seconds.      Findings: No erythema or rash.   Neurological:      Mental Status: He is alert and oriented to person, place, and time. Mental status is at baseline.   Psychiatric:         Attention and Perception: Attention normal.         Mood and Affect: Mood normal.         Speech: Speech normal.         Behavior: Behavior normal. Behavior is cooperative.         Thought Content: Thought content normal.         Judgment: Judgment normal.       Office Visit on 09/16/2024   Component Date Value Ref Range Status    Cologuard Result 10/21/2024 Negative  Negative Final    Comment:   NEGATIVE TEST RESULT. A negative Cologuard result indicates a low likelihood that a colorectal cancer (CRC) or advanced adenoma (adenomatous polyps with more advanced pre-malignant features)  is present. The chance that a person with a negative Cologuard test has a colorectal cancer is less than 1 in 1500 (negative predictive value >99.9%) or has an  advanced adenoma is less than  5.3% (negative predictive value 94.7%). These data are based on a prospective cross-sectional study of 10,000 individuals at average risk for colorectal cancer who were screened with both Cologuard and colonoscopy. (Chato Kay al, N Engl J Med 2014;370(14):8249-6589) The normal value  (reference range) for this assay is negative.    COLOGUARD RE-SCREENING RECOMMENDATION: Periodic colorectal cancer screening is an important part of preventive healthcare for asymptomatic individuals at average risk for colorectal cancer.  Following a negative Cologuard result, the American Cancer Society and U.S.                            Multi-Society Task Force screening guidelines recommend a Cologuard re-screening interval of 3 years.   References: American Cancer Society Guideline for Colorectal Cancer Screening: https://www.cancer.org/cancer/colon-rectal-cancer/ukgchwanf-jskcynyee-yvahyhm/acs-recommendations.html.; Mayank DK, Qian ZHENG, Tamra SHEAK, Colorectal Cancer Screening: Recommendations for Physicians and Patients from the U.S. Multi-Society Task Force on Colorectal Cancer Screening , Am J Gastroenterology 2017; 112:3471-3330.    TEST DESCRIPTION: Composite algorithmic analysis of stool DNA-biomarkers with hemoglobin immunoassay.   Quantitative values of individual biomarkers are not reportable and are not associated with individual biomarker result reference ranges. Cologuard is intended for colorectal cancer screening of adults of either sex, 45 years or older, who are at average-risk for colorectal cancer (CRC). Cologuard has been approved for use by the U.S. FDA. The performance of Cologuard was                            established in a cross sectional study of average-risk adults aged 50-84. Cologuard performance in patients ages 45 to 49 years was estimated by sub-group analysis of near-age groups. Colonoscopies performed for a positive result may find as the most clinically significant lesion: colorectal cancer [4.0%], advanced adenoma (including sessile serrated polyps greater than or equal to 1cm diameter) [20%] or non- advanced adenoma [31%]; or no colorectal neoplasia [45%]. These estimates are derived from a prospective cross-sectional screening study of 10,000 individuals at average risk  for colorectal cancer who were screened with both Cologuard and colonoscopy. (Chato Kay al, N Engl J Med 2014;370(14):9222-6902.) Cologuard may produce a false negative or false positive result (no colorectal cancer or precancerous polyp present at colonoscopy follow up). A negative Cologuard test result does not guarantee the absence of CRC or advanced adenoma (pre-cancer). The current Cologuard                            screening interval is every 3 years. (American Cancer Society and U.S. Multi-Society Task Force). Cologuard performance data in a 10,000 patient pivotal study using colonoscopy as the reference method can be accessed at the following location: [email or web address]. Additional description of the Cologuard test process, warnings and precautions can be found at [email or web address].     Office Visit on 08/16/2024   Component Date Value Ref Range Status    Glucose 08/19/2024 104 (H)  70 - 99 mg/dL Final    BUN 08/19/2024 11  6 - 24 mg/dL Final    Creatinine 08/19/2024 0.99  0.76 - 1.27 mg/dL Final    eGFR 08/19/2024 95  >59 mL/min/1.73 Final    BUN/Creatinine Ratio 08/19/2024 11  9 - 20 Final    Sodium 08/19/2024 139  134 - 144 mmol/L Final    Potassium 08/19/2024 4.5  3.5 - 5.2 mmol/L Final    Chloride 08/19/2024 101  96 - 106 mmol/L Final    CO2 08/19/2024 26  20 - 29 mmol/L Final    Calcium 08/19/2024 9.2  8.7 - 10.2 mg/dL Final    Protein, Total 08/19/2024 6.5  6.0 - 8.5 g/dL Final    Albumin 08/19/2024 4.0 (L)  4.1 - 5.1 g/dL Final    Globulin, Total 08/19/2024 2.5  1.5 - 4.5 g/dL Final    Total Bilirubin 08/19/2024 0.7  0.0 - 1.2 mg/dL Final    Alkaline Phosphatase 08/19/2024 74  44 - 121 IU/L Final    AST 08/19/2024 15  0 - 40 IU/L Final    ALT 08/19/2024 17  0 - 44 IU/L Final    Cholesterol 08/19/2024 155  100 - 199 mg/dL Final    Triglycerides 08/19/2024 81  0 - 149 mg/dL Final    HDL 08/19/2024 37 (L)  >39 mg/dL Final    VLDL Cholesterol Lalit 08/19/2024 16  5 - 40 mg/dL Final    LDL  Calculated 08/19/2024 102 (H)  0 - 99 mg/dL Final    TSH 08/19/2024 2.230  0.450 - 4.500 uIU/mL Final    Creatinine, Urine 08/19/2024 301.7  Not Estab. mg/dL Final    Microalb, Ur 08/19/2024 8.3  Not Estab. ug/mL Final    Microalb/Crt. Ratio 08/19/2024 3  0 - 29 mg/g creat Final    Comment:                        Normal:                0 -  29                         Moderately increased: 30 - 300                         Severely increased:       >300      Hemoglobin A1c 08/19/2024 5.2  4.8 - 5.6 % Final    Comment:          Prediabetes: 5.7 - 6.4           Diabetes: >6.4           Glycemic control for adults with diabetes: <7.0      Testosterone 08/19/2024 300  264 - 916 ng/dL Final    Comment: Adult male reference interval is based on a population of  healthy nonobese males (BMI <30) between 19 and 39 years old.  Oskar et.al. JCEM 2017,102;2804-3068. PMID: [id number].            Assessment:       1. Attention deficit hyperactivity disorder (ADHD), unspecified ADHD type    2. Encounter for weight management    3. Essential hypertension    4. Other hyperlipidemia    5. Class 2 severe obesity due to excess calories with serious comorbidity and body mass index (BMI) of 39.0 to 39.9 in adult    6. Diverticulitis    7. LLQ pain        Plan:       Assessment & Plan    IMPRESSION:  - Assessed reported diverticulitis flare-up; symptoms began yesterday evening  - Considered watchful waiting approach vs. antibiotic treatment for diverticulitis  - Evaluated need for pain management  - Reviewed current ADHD management and considered dose adjustment  - Noted umbilical hernia on exam    DIVERTICULITIS:  - Evaluated the patient's diverticulitis flare-up, which started yesterday afternoon, causing discomfort in the left lower quadrant.  - Mr. Roman reports no nausea, vomiting, blood in stool, or fever.  - The last significant flare-up was approximately 1 year ago.  - Assessed the patient's pain, described as feeling like a pulled  muscle with occasional shooting sensations.  - Explained the current approach to diverticulitis management, including watchful waiting versus antibiotic use.  - Discussed potential complications of diverticulitis, including micro-perforations, and the need for a CT if systemic symptoms develop.  - Advised on signs and symptoms that would warrant immediate medical attention.  - Recommend a bland diet and increased fluid intake.  - Prescribed Augmentin as a precautionary measure for potential use in diverticulitis flare-up; instructed to hold and use only if symptoms worsen.  - Prescribed Tramadol for pain management if needed.  - Informed the patient about the need for colonoscopy after the flare-up resolves due to the diverticulitis episode.    GASTROENTEROLOGY REFERRAL:  - Referred the patient to gastroenterology through [facility] for evaluation of diverticulitis and potential colonoscopy.  - The gastroenterology office will contact the patient next week to schedule an appointment.    WEIGHT MANAGEMENT:  - Monitored the patient's weight, which is 314 lbs with boots on, up from 306 lbs at the last visit.  - Noted that the patient reports maintaining weight through holidays without strict dieting.  - Discussed weight loss options, including GLP-1 agonists (Zepbound, Wegovy).  - Prescribed Wegovy 0.5 mg for weight management; to be filled at [facility] if covered by insurance.  - Considered potential referral to bariatric clinic for further weight management options.    HYPERTENSION:  - Monitored the patient's blood pressure, which is 118/88.  - Noted that the patient reports blood pressure staying in check despite job stress.  - Continued the current treatment regimen of lisinopril-hydrochlorothiazide 10-12.5mg for blood pressure management.  - Reduce the amount of salt in your diet; Lose weight; Avoid drinking too much alcohol; Exercise at least 30 minutes per day most days of the week.    - Continue  current medications and home BP monitoring.    ATTENTION DEFICIT HYPERACTIVITY DISORDER (ADHD):  - Monitored the patient's response to current ADHD medication, noting no chest pain or palpitations.  - Evaluated the patient's report of improved focus and task completion with medication, although the patient is unsure if it's significantly better than before.  - Discussed increasing the dosage from 15mg to 20mg.  - Increased Adderall XR from 15 mg to 20 mg daily.    HERNIA:  - Evaluated the patient's report of a bad umbilical hernia and possibly another hernia.  - Confirmed the presence of hernia through physical exam.  - Hernia is soft and reducible.     LABS:  - CBC, CMP ordered as routine lab work [facility] [place].  - Check patient portal for lab results next week.    FOLLOW UP:  - Advised to follow up if diverticulitis symptoms worsen or if systemic symptoms develop.        Attention deficit hyperactivity disorder (ADHD), unspecified ADHD type  -     TSH; Future; Expected date: 02/14/2025  -     dextroamphetamine-amphetamine (ADDERALL XR) 20 MG 24 hr capsule; Take 1 capsule (20 mg total) by mouth every morning.  Dispense: 30 capsule; Refill: 0    Encounter for weight management    Essential hypertension  -     Comprehensive Metabolic Panel; Future; Expected date: 02/14/2025  -     Lipid Panel; Future; Expected date: 02/14/2025  -     TSH; Future; Expected date: 02/14/2025    Other hyperlipidemia  -     Comprehensive Metabolic Panel; Future; Expected date: 02/14/2025  -     Lipid Panel; Future; Expected date: 02/14/2025  -     TSH; Future; Expected date: 02/14/2025    Class 2 severe obesity due to excess calories with serious comorbidity and body mass index (BMI) of 39.0 to 39.9 in adult  -     semaglutide, weight loss, (WEGOVY) 0.5 mg/0.5 mL PnIj; Inject 0.5 mg into the skin every 7 days.  Dispense: 2 mL; Refill: 3    Diverticulitis  -     amoxicillin-clavulanate 875-125mg (AUGMENTIN) 875-125  mg per tablet; Take 1 tablet by mouth every 12 (twelve) hours.for 7 days  Dispense: 14 tablet; Refill: 0  -     traMADoL (ULTRAM) 50 mg tablet; Take 1 tablet (50 mg total) by mouth every 6 (six) hours as needed for Pain.  Dispense: 12 each; Refill: 0  -     Ambulatory referral/consult to Gastroenterology; Future; Expected date: 02/21/2025    LLQ pain  -     traMADoL (ULTRAM) 50 mg tablet; Take 1 tablet (50 mg total) by mouth every 6 (six) hours as needed for Pain.  Dispense: 12 each; Refill: 0    I spent a total of 26 minutes on the day of the visit.This includes face to face time and non-face to face time preparing to see the patient (eg, review of tests), obtaining and/or reviewing separately obtained history, documenting clinical information in the electronic or other health record, independently interpreting results and communicating results to the patient/family/caregiver, or care coordinator.    Follow up in about 3 months (around 5/14/2025) for ADHD.          2/14/2025 RICARDO Ruby, MAXINE    This note was generated with the assistance of ambient listening technology. Verbal consent was obtained by the patient and accompanying visitor(s) for the recording of patient appointment to facilitate this note. I attest to having reviewed and edited the generated note for accuracy, though some syntax or spelling errors may persist. Please contact the author of this note for any clarification.

## 2025-02-26 DIAGNOSIS — I10 ESSENTIAL HYPERTENSION: ICD-10-CM

## 2025-02-26 RX ORDER — LISINOPRIL AND HYDROCHLOROTHIAZIDE 10; 12.5 MG/1; MG/1
1 TABLET ORAL
Qty: 90 TABLET | Refills: 1 | Status: SHIPPED | OUTPATIENT
Start: 2025-02-26

## 2025-04-03 DIAGNOSIS — F90.9 ATTENTION DEFICIT HYPERACTIVITY DISORDER (ADHD), UNSPECIFIED ADHD TYPE: ICD-10-CM

## 2025-04-03 RX ORDER — DEXTROAMPHETAMINE SACCHARATE, AMPHETAMINE ASPARTATE MONOHYDRATE, DEXTROAMPHETAMINE SULFATE AND AMPHETAMINE SULFATE 5; 5; 5; 5 MG/1; MG/1; MG/1; MG/1
20 CAPSULE, EXTENDED RELEASE ORAL EVERY MORNING
Qty: 30 CAPSULE | Refills: 0 | Status: SHIPPED | OUTPATIENT
Start: 2025-04-03

## 2025-04-19 ENCOUNTER — HOSPITAL ENCOUNTER (EMERGENCY)
Facility: HOSPITAL | Age: 50
Discharge: HOME | End: 2025-04-19
Attending: STUDENT IN AN ORGANIZED HEALTH CARE EDUCATION/TRAINING PROGRAM
Payer: COMMERCIAL

## 2025-04-19 VITALS
OXYGEN SATURATION: 99 % | DIASTOLIC BLOOD PRESSURE: 67 MMHG | RESPIRATION RATE: 18 BRPM | TEMPERATURE: 98 F | WEIGHT: 308 LBS | BODY MASS INDEX: 38.3 KG/M2 | HEART RATE: 74 BPM | HEIGHT: 75 IN | SYSTOLIC BLOOD PRESSURE: 113 MMHG

## 2025-04-19 DIAGNOSIS — R55 VASOVAGAL SYNCOPE: Primary | ICD-10-CM

## 2025-04-19 DIAGNOSIS — R55 SYNCOPE: ICD-10-CM

## 2025-04-19 DIAGNOSIS — S01.511A COMPLICATED LACERATION OF LIP, INITIAL ENCOUNTER: ICD-10-CM

## 2025-04-19 DIAGNOSIS — S01.21XA LACERATION OF NOSE, INITIAL ENCOUNTER: ICD-10-CM

## 2025-04-19 DIAGNOSIS — S02.2XXA CLOSED FRACTURE OF NASAL BONE, INITIAL ENCOUNTER: ICD-10-CM

## 2025-04-19 DIAGNOSIS — M25.569 KNEE PAIN: ICD-10-CM

## 2025-04-19 LAB
ABSOLUTE EOSINOPHIL (SMH): 0.22 K/UL
ABSOLUTE MONOCYTE (SMH): 0.77 K/UL (ref 0.3–1)
ABSOLUTE NEUTROPHIL COUNT (SMH): 12.2 K/UL (ref 1.8–7.7)
ALBUMIN SERPL-MCNC: 4.1 G/DL (ref 3.5–5.2)
ALP SERPL-CCNC: 66 UNIT/L (ref 55–135)
ALT SERPL-CCNC: 17 UNIT/L (ref 10–44)
ANION GAP (SMH): 7 MMOL/L (ref 8–16)
APTT PPP: 29.3 SECONDS (ref 21–32)
AST SERPL-CCNC: 12 UNIT/L (ref 10–40)
BASOPHILS # BLD AUTO: 0.09 K/UL
BASOPHILS NFR BLD AUTO: 0.6 %
BILIRUB SERPL-MCNC: 0.5 MG/DL (ref 0.1–1)
BNP SERPL-MCNC: 2 PG/ML
BUN SERPL-MCNC: 15 MG/DL (ref 6–20)
CALCIUM SERPL-MCNC: 9.2 MG/DL (ref 8.7–10.5)
CHLORIDE SERPL-SCNC: 102 MMOL/L (ref 95–110)
CO2 SERPL-SCNC: 29 MMOL/L (ref 23–29)
CREAT SERPL-MCNC: 1.2 MG/DL (ref 0.5–1.4)
ERYTHROCYTE [DISTWIDTH] IN BLOOD BY AUTOMATED COUNT: 12.6 % (ref 11.5–14.5)
ETHANOL SERPL-MCNC: <10 MG/DL
GFR SERPLBLD CREATININE-BSD FMLA CKD-EPI: >60 ML/MIN/1.73/M2
GLUCOSE SERPL-MCNC: 153 MG/DL (ref 70–110)
HCT VFR BLD AUTO: 47 % (ref 40–54)
HGB BLD-MCNC: 15.6 GM/DL (ref 14–18)
IMM GRANULOCYTES # BLD AUTO: 0.1 K/UL (ref 0–0.04)
IMM GRANULOCYTES NFR BLD AUTO: 0.6 % (ref 0–0.5)
INR PPP: 0.9 (ref 0.8–1.2)
LYMPHOCYTES # BLD AUTO: 2.4 K/UL (ref 1–4.8)
MCH RBC QN AUTO: 30.8 PG (ref 27–31)
MCHC RBC AUTO-ENTMCNC: 33.2 G/DL (ref 32–36)
MCV RBC AUTO: 93 FL (ref 82–98)
NUCLEATED RBC (/100WBC) (SMH): 0 /100 WBC
PLATELET # BLD AUTO: 314 K/UL (ref 150–450)
PMV BLD AUTO: 10.1 FL (ref 9.2–12.9)
POTASSIUM SERPL-SCNC: 3.6 MMOL/L (ref 3.5–5.1)
PROT SERPL-MCNC: 6.9 GM/DL (ref 6–8.4)
PROTHROMBIN TIME: 10.6 SECONDS (ref 9–12.5)
RBC # BLD AUTO: 5.06 M/UL (ref 4.6–6.2)
RELATIVE EOSINOPHIL (SMH): 1.4 % (ref 0–8)
RELATIVE LYMPHOCYTE (SMH): 15.2 % (ref 18–48)
RELATIVE MONOCYTE (SMH): 4.9 % (ref 4–15)
RELATIVE NEUTROPHIL (SMH): 77.3 % (ref 38–73)
SODIUM SERPL-SCNC: 138 MMOL/L (ref 136–145)
TROPONIN HIGH SENSITIVE (SMH): <2.3 PG/ML
WBC # BLD AUTO: 15.78 K/UL (ref 3.9–12.7)

## 2025-04-19 PROCEDURE — 12011 RPR F/E/E/N/L/M 2.5 CM/<: CPT

## 2025-04-19 PROCEDURE — 93010 ELECTROCARDIOGRAM REPORT: CPT | Mod: ,,, | Performed by: INTERNAL MEDICINE

## 2025-04-19 PROCEDURE — 96374 THER/PROPH/DIAG INJ IV PUSH: CPT

## 2025-04-19 PROCEDURE — 85730 THROMBOPLASTIN TIME PARTIAL: CPT | Performed by: STUDENT IN AN ORGANIZED HEALTH CARE EDUCATION/TRAINING PROGRAM

## 2025-04-19 PROCEDURE — 90715 TDAP VACCINE 7 YRS/> IM: CPT | Performed by: STUDENT IN AN ORGANIZED HEALTH CARE EDUCATION/TRAINING PROGRAM

## 2025-04-19 PROCEDURE — 90471 IMMUNIZATION ADMIN: CPT | Performed by: STUDENT IN AN ORGANIZED HEALTH CARE EDUCATION/TRAINING PROGRAM

## 2025-04-19 PROCEDURE — 80053 COMPREHEN METABOLIC PANEL: CPT | Performed by: STUDENT IN AN ORGANIZED HEALTH CARE EDUCATION/TRAINING PROGRAM

## 2025-04-19 PROCEDURE — 85025 COMPLETE CBC W/AUTO DIFF WBC: CPT | Performed by: STUDENT IN AN ORGANIZED HEALTH CARE EDUCATION/TRAINING PROGRAM

## 2025-04-19 PROCEDURE — 82077 ASSAY SPEC XCP UR&BREATH IA: CPT | Performed by: STUDENT IN AN ORGANIZED HEALTH CARE EDUCATION/TRAINING PROGRAM

## 2025-04-19 PROCEDURE — 40650 RPR LIP FTH VERMILION ONLY: CPT

## 2025-04-19 PROCEDURE — 96375 TX/PRO/DX INJ NEW DRUG ADDON: CPT

## 2025-04-19 PROCEDURE — 84484 ASSAY OF TROPONIN QUANT: CPT | Performed by: STUDENT IN AN ORGANIZED HEALTH CARE EDUCATION/TRAINING PROGRAM

## 2025-04-19 PROCEDURE — 85610 PROTHROMBIN TIME: CPT | Performed by: STUDENT IN AN ORGANIZED HEALTH CARE EDUCATION/TRAINING PROGRAM

## 2025-04-19 PROCEDURE — 99285 EMERGENCY DEPT VISIT HI MDM: CPT | Mod: 25

## 2025-04-19 PROCEDURE — 63600175 PHARM REV CODE 636 W HCPCS: Performed by: STUDENT IN AN ORGANIZED HEALTH CARE EDUCATION/TRAINING PROGRAM

## 2025-04-19 PROCEDURE — 83880 ASSAY OF NATRIURETIC PEPTIDE: CPT | Performed by: STUDENT IN AN ORGANIZED HEALTH CARE EDUCATION/TRAINING PROGRAM

## 2025-04-19 PROCEDURE — 93005 ELECTROCARDIOGRAM TRACING: CPT | Performed by: INTERNAL MEDICINE

## 2025-04-19 PROCEDURE — 25000003 PHARM REV CODE 250: Performed by: STUDENT IN AN ORGANIZED HEALTH CARE EDUCATION/TRAINING PROGRAM

## 2025-04-19 RX ORDER — LIDOCAINE HYDROCHLORIDE 10 MG/ML
5 INJECTION, SOLUTION EPIDURAL; INFILTRATION; INTRACAUDAL; PERINEURAL
Status: COMPLETED | OUTPATIENT
Start: 2025-04-19 | End: 2025-04-19

## 2025-04-19 RX ORDER — HYDROCODONE BITARTRATE AND ACETAMINOPHEN 5; 325 MG/1; MG/1
1 TABLET ORAL
Refills: 0 | Status: COMPLETED | OUTPATIENT
Start: 2025-04-19 | End: 2025-04-19

## 2025-04-19 RX ORDER — OXYMETAZOLINE HCL 0.05 %
1 SPRAY, NON-AEROSOL (ML) NASAL 2 TIMES DAILY
Qty: 15 ML | Refills: 0 | Status: SHIPPED | OUTPATIENT
Start: 2025-04-19 | End: 2025-04-22

## 2025-04-19 RX ORDER — ACETAMINOPHEN 500 MG
1000 TABLET ORAL
Status: DISCONTINUED | OUTPATIENT
Start: 2025-04-19 | End: 2025-04-19

## 2025-04-19 RX ORDER — CEFAZOLIN 2 G/1
2 INJECTION, POWDER, FOR SOLUTION INTRAMUSCULAR; INTRAVENOUS
Status: COMPLETED | OUTPATIENT
Start: 2025-04-19 | End: 2025-04-19

## 2025-04-19 RX ORDER — HYDROCODONE BITARTRATE AND ACETAMINOPHEN 5; 325 MG/1; MG/1
1 TABLET ORAL EVERY 6 HOURS PRN
Qty: 8 TABLET | Refills: 0 | Status: SHIPPED | OUTPATIENT
Start: 2025-04-19 | End: 2025-04-21 | Stop reason: DRUGHIGH

## 2025-04-19 RX ORDER — MORPHINE SULFATE 4 MG/ML
4 INJECTION, SOLUTION INTRAMUSCULAR; INTRAVENOUS
Refills: 0 | Status: COMPLETED | OUTPATIENT
Start: 2025-04-19 | End: 2025-04-19

## 2025-04-19 RX ORDER — CEPHALEXIN 500 MG/1
500 CAPSULE ORAL EVERY 12 HOURS
Qty: 14 CAPSULE | Refills: 0 | Status: SHIPPED | OUTPATIENT
Start: 2025-04-19 | End: 2025-04-26

## 2025-04-19 RX ORDER — CEPHALEXIN 500 MG/1
500 CAPSULE ORAL EVERY 12 HOURS
Qty: 14 CAPSULE | Refills: 0 | Status: SHIPPED | OUTPATIENT
Start: 2025-04-19 | End: 2025-04-19

## 2025-04-19 RX ORDER — VITAMIN A 3000 MCG
1 CAPSULE ORAL
Qty: 50 ML | Refills: 12 | Status: SHIPPED | OUTPATIENT
Start: 2025-04-19

## 2025-04-19 RX ADMIN — LIDOCAINE HYDROCHLORIDE 50 MG: 10 INJECTION, SOLUTION EPIDURAL; INFILTRATION; INTRACAUDAL at 04:04

## 2025-04-19 RX ADMIN — MORPHINE SULFATE 4 MG: 4 INJECTION, SOLUTION INTRAMUSCULAR; INTRAVENOUS at 03:04

## 2025-04-19 RX ADMIN — CLOSTRIDIUM TETANI TOXOID ANTIGEN (FORMALDEHYDE INACTIVATED), CORYNEBACTERIUM DIPHTHERIAE TOXOID ANTIGEN (FORMALDEHYDE INACTIVATED), BORDETELLA PERTUSSIS TOXOID ANTIGEN (GLUTARALDEHYDE INACTIVATED), BORDETELLA PERTUSSIS FILAMENTOUS HEMAGGLUTININ ANTIGEN (FORMALDEHYDE INACTIVATED), BORDETELLA PERTUSSIS PERTACTIN ANTIGEN, AND BORDETELLA PERTUSSIS FIMBRIAE 2/3 ANTIGEN 0.5 ML: 5; 2; 2.5; 5; 3; 5 INJECTION, SUSPENSION INTRAMUSCULAR at 04:04

## 2025-04-19 RX ADMIN — HYDROCODONE BITARTRATE AND ACETAMINOPHEN 1 TABLET: 5; 325 TABLET ORAL at 05:04

## 2025-04-19 RX ADMIN — CEFAZOLIN 2 G: 2 INJECTION, POWDER, FOR SOLUTION INTRAMUSCULAR; INTRAVENOUS at 04:04

## 2025-04-19 NOTE — ED PROVIDER NOTES
Encounter Date: 4/19/2025       History     Chief Complaint   Patient presents with    Loss of Consciousness     Pt lost consciousness shortly after a BM     HPI    Joo Roman is a 49 y.o. male that presents to the ED for evaluation of syncopal episode.  Patient was in his usual state of health when he had abdominal cramping and felt like he needed to go the bathroom.  Got up to go use the restroom and felt like he could not go.  He was straining and had cold sweats.  When he stood up from the toilet, he had a syncopal episode.  Last thing he remembers is standing up and he will found himself on the ground with blood on his face.  Did not have any preceding chest pain, shortness of breath, or palpitations.  Does complain of right knee pain but no distal numbness or weakness.  Has multiple facial lacerations.  Review of patient's allergies indicates:  No Known Allergies  No past medical history on file.  Past Surgical History:   Procedure Laterality Date    ADENOIDECTOMY  2008    BALLOON SINUPLASTY OF PARANASAL SINUS  2008    BICEPS TENDON REPAIR Right 2015    Dr Garcia    HERNIA REPAIR      x3, 2000, mesh repair 2006? & 2008    TENDON REPAIR Left 2011    ankle, Dr Brown    TONSILLECTOMY  2008     Family History   Problem Relation Name Age of Onset    Thyroid disease Mother      Heart disease Father       Social History[1]  Review of Systems   All other systems reviewed and are negative.      Physical Exam     Initial Vitals [04/19/25 0252]   BP Pulse Resp Temp SpO2   110/69 75 16 97.7 °F (36.5 °C) 95 %      MAP       --         Physical Exam    Nursing note and vitals reviewed.  Constitutional: He appears well-developed and well-nourished.   HENT:   Head: Normocephalic.   Large lip laceration that extends to the in her mouth.  Full-thickness and begins at the vermilion border of the upper lip.  Laceration on the tip of the nose which is a proximally 1 cm in length and 4 mm in depth.  No septal hematoma.   Eyes:  EOM are normal. Pupils are equal, round, and reactive to light.   Neck:   Normal range of motion.  Cardiovascular:  Normal rate, regular rhythm and normal heart sounds.           Pulmonary/Chest: Breath sounds normal. No respiratory distress.   Abdominal: Abdomen is soft. He exhibits no distension. There is no abdominal tenderness. There is no rebound.   Musculoskeletal:         General: Normal range of motion.      Cervical back: Normal range of motion.     Neurological: He is alert and oriented to person, place, and time. He has normal strength. No cranial nerve deficit or sensory deficit. GCS score is 15. GCS eye subscore is 4. GCS verbal subscore is 5. GCS motor subscore is 6.   Skin: Capillary refill takes less than 2 seconds.   Psychiatric: He has a normal mood and affect.         ED Course   Lac Repair    Date/Time: 4/19/2025 6:39 AM    Performed by: Yon Lewis MD  Authorized by: Yon Lewis MD    Consent:     Consent obtained:  Verbal    Consent given by:  Patient    Risks discussed:  Infection, need for additional repair, nerve damage, poor wound healing, poor cosmetic result and pain    Alternatives discussed:  No treatment  Universal protocol:     Patient identity confirmed:  Verbally with patient  Anesthesia:     Anesthesia method:  Local infiltration    Local anesthetic:  Lidocaine 1% w/o epi  Laceration details:     Location:  Face    Face location:  Nose    Length (cm):  1    Depth (mm):  4  Pre-procedure details:     Preparation:  Patient was prepped and draped in usual sterile fashion  Exploration:     Limited defect created (wound extended): no      Hemostasis achieved with:  Direct pressure    Imaging obtained comment:  CT    Imaging outcome: foreign body not noted      Wound exploration: wound explored through full range of motion      Wound extent: areolar tissue violated      Wound extent: no foreign bodies/material noted and no muscle damage noted    Treatment:      Area cleansed with:  Saline (Hydrogen peroxide)    Amount of cleaning:  Standard    Irrigation solution:  Sterile saline    Irrigation method:  Syringe    Debridement:  None    Undermining:  None  Skin repair:     Repair method:  Sutures    Suture size:  4-0    Wound skin closure material used: Vicryl.    Suture technique:  Simple interrupted    Number of sutures:  3  Approximation:     Approximation:  Close  Repair type:     Repair type:  Simple  Post-procedure details:     Dressing:  Open (no dressing)  Lac Repair    Date/Time: 4/19/2025 6:41 AM    Performed by: Yon Lewis MD  Authorized by: Yon Lewis MD    Consent:     Consent obtained:  Verbal    Consent given by:  Patient    Risks discussed:  Infection, need for additional repair, nerve damage, poor wound healing, poor cosmetic result and pain    Alternatives discussed:  No treatment  Universal protocol:     Patient identity confirmed:  Verbally with patient  Laceration details:     Location:  Lip    Lip location:  Upper lip, full thickness    Vermilion border involved: yes      Height of lip laceration:  Up to half vertical height    Length (cm):  2.5 (Laceration of the exterior lip was full-thickness in nature and extended up the midline of the upper lip to the gumline)    Depth (mm):  10  Pre-procedure details:     Preparation:  Patient was prepped and draped in usual sterile fashion and imaging obtained to evaluate for foreign bodies  Exploration:     Hemostasis achieved with:  Direct pressure    Imaging obtained comment:  CT    Imaging outcome: foreign body not noted      Wound exploration: wound explored through full range of motion      Wound extent: areolar tissue violated    Treatment:     Area cleansed with:  Saline    Amount of cleaning:  Standard    Irrigation solution:  Sterile saline    Irrigation volume:  100    Irrigation method:  Syringe    Visualized foreign bodies/material removed: no      Debridement:  None     Undermining:  None  Skin repair:     Repair method:  Sutures    Suture size:  4-0    Wound skin closure material used: Vicryl.    Suture technique:  Simple interrupted    Number of sutures:  15  Approximation:     Approximation:  Close    Vermilion border well-aligned: yes    Repair type:     Repair type:  Intermediate  Post-procedure details:     Dressing:  Open (no dressing)    Procedure completion:  Tolerated well, no immediate complications    Labs Reviewed   COMPREHENSIVE METABOLIC PANEL - Abnormal       Result Value    Sodium 138      Potassium 3.6      Chloride 102      CO2 29      Glucose 153 (*)     BUN 15      Creatinine 1.2      Calcium 9.2      Protein Total 6.9      Albumin 4.1      Bilirubin Total 0.5      ALP 66      AST 12      ALT 17      Anion Gap 7 (*)     eGFR >60     CBC WITH DIFFERENTIAL - Abnormal    WBC 15.78 (*)     RBC 5.06      Hgb 15.6      Hct 47.0      MCV 93      MCH 30.8      MCHC 33.2      RDW 12.6      Platelet Count 314      MPV 10.1      Nucleated RBC 0      Neut % 77.3 (*)     Lymph % 15.2 (*)     Mono % 4.9      Eos % 1.4      Basophil % 0.6      Imm Grans % 0.6 (*)     Neut # 12.2 (*)     Lymph # 2.40      Mono # 0.77      Eos # 0.22      Baso # 0.09      Imm Grans # 0.10 (*)    TROPONIN I HIGH SENSITIVITY - Normal    Troponin High Sensitive <2.3     B-TYPE NATRIURETIC PEPTIDE - Normal    BNP 2     APTT - Normal    PTT 29.3     PROTIME-INR - Normal    PT 10.6      INR 0.9     ALCOHOL,MEDICAL (ETHANOL) - Normal    Alcohol, Serum <10     CBC W/ AUTO DIFFERENTIAL    Narrative:     The following orders were created for panel order CBC auto differential.  Procedure                               Abnormality         Status                     ---------                               -----------         ------                     CBC with Differential[[id number]]       Abnormal            Final result                 Please view results for these tests on the individual orders.     EKG  Readings: (Independently Interpreted)   Initial Reading: No STEMI. Rhythm: Normal Sinus Rhythm. Ectopy: No Ectopy. Conduction: Normal. ST Segments: Normal ST Segments. T Waves: Normal. Clinical Impression: Normal Sinus Rhythm     ECG Results              EKG 12-lead (In process)        Collection Time Result Time QRS Duration OHS QTC Calculation    04/19/25 02:50:52 04/19/25 04:53:09 100 444                     In process by Interface, Lab In [facility] (04/19/25 04:53:13)                   Narrative:    Test Reason : R55,    Vent. Rate :  78 BPM     Atrial Rate :  78 BPM     P-R Int : 176 ms          QRS Dur : 100 ms      QT Int : 390 ms       P-R-T Axes :  35 -67  64 degrees    QTcB Int : 444 ms    Normal sinus rhythm  Left axis deviation  Cannot rule out Anterior infarct ,age undetermined  Abnormal ECG  No previous ECGs available    Referred By: AAAREFERRAL SELF           Confirmed By:                                   Imaging Results              X-Ray Chest AP Portable (Final result)  Result time 04/19/25 03:47:09      Final result by Nilo Mchugh MD (04/19/25 03:47:09)                   Impression:      No acute findings in the chest.      Electronically signed by: Nilo Mchugh MD  Date:    04/19/2025  Time:    03:47               Narrative:    EXAMINATION:  XR CHEST AP PORTABLE    CLINICAL HISTORY:  Syncope and collapse    TECHNIQUE:  Single frontal view of the chest was performed.    COMPARISON:  08/03/2015.    FINDINGS:  No consolidation, pleural effusion or pneumothorax.    Cardiomediastinal silhouette is unremarkable.                                       X-Ray Knee 3 View Right (Final result)  Result time 04/19/25 03:50:53      Final result by Nilo Mchugh MD (04/19/25 03:50:53)                   Impression:      No acute fracture.      Electronically signed by: Nilo Mchugh MD  Date:    04/19/2025  Time:    03:50               Narrative:    EXAMINATION:  XR KNEE 3 VIEW RIGHT    CLINICAL  HISTORY:  Pain in unspecified knee    TECHNIQUE:  AP, lateral, and Merchant views of the right knee were performed.    COMPARISON:  None    FINDINGS:  No fracture or dislocation.  No joint effusion.  Mild tricompartment degenerative change.                                       CT Maxillofacial Without Contrast (Final result)  Result time 04/19/25 03:43:06      Final result by Nilo Mchugh MD (04/19/25 03:43:06)                   Impression:      Mildly displaced comminuted acute nasal bone fracture with overlying soft tissue swelling and air in the soft tissues.  Small fragments appear to be impacted posteriorly.  No additional acute maxillofacial fracture identified.    Small air-fluid level right maxillary antrum, possibly blood products in the post trauma setting.      Electronically signed by: Nilo Mchugh MD  Date:    04/19/2025  Time:    03:43               Narrative:    EXAMINATION:  CT MAXILLOFACIAL WITHOUT CONTRAST    CLINICAL HISTORY:  Facial trauma, blunt;    TECHNIQUE:  Low dose axial images, sagittal and coronal reformations were obtained through the face.  Contrast was not administered.    COMPARISON:  None    FINDINGS:    Mildly displaced comminuted acute nasal bone fracture with overlying soft tissue swelling and air in the soft tissues.  Small fragments appear to be impacted posteriorly.  No additional acute maxillofacial fracture.  The bony orbits, zygomatic arches and mandible appear intact.Small air-fluid level right maxillary antrum.  Remaining paranasal sinuses are aerated and clear.  Orbital contents appear unremarkable.                                       CT Cervical Spine Without Contrast (Final result)  Result time 04/19/25 03:46:16      Final result by Nilo Mchugh MD (04/19/25 03:46:16)                   Impression:      No acute cervical fracture.      Electronically signed by: Nilo Mchugh MD  Date:    04/19/2025  Time:    03:46               Narrative:     EXAMINATION:  CT CERVICAL SPINE WITHOUT CONTRAST    CLINICAL HISTORY:  Neck trauma, dangerous injury mechanism (Age 16-64y);    TECHNIQUE:  Low dose axial images, sagittal and coronal reformations were performed though the cervical spine.  Contrast was not administered.    COMPARISON:  None    FINDINGS:    Normal alignment. No prevertebral soft tissue thickening. Mild degenerative disc space narrowing and spurring anteriorly at C5-6 and C6-7 levels.  The craniocervical junction, the dens, cervical vertebra and remaining cervical intervertebral disc spaces appear adequately maintained.                                       CT Head Without Contrast (Final result)  Result time 04/19/25 03:39:07      Final result by Nilo Mchugh MD (04/19/25 03:39:07)                   Impression:      No acute intracranial abnormalities.    Mild displaced nasal bone fracture with overlying soft tissue swelling.      Electronically signed by: Nilo Mchugh MD  Date:    04/19/2025  Time:    03:39               Narrative:    EXAMINATION:  CT HEAD WITHOUT CONTRAST    CLINICAL HISTORY:  Facial trauma, blunt;    TECHNIQUE:  Low dose axial images were obtained through the head.  Coronal and sagittal reformations were also performed. Contrast was not administered.    COMPARISON:  None.    FINDINGS:  The brain parenchyma appears normal for age with good corticomedullary differentiation.  There is no evidence of acute infarct, hemorrhage, or mass.  The ventricular system is normal in size.  No mass-effect or midline shift.  There are no abnormal extra-axial fluid collections.  The paranasal sinuses and mastoid air cells are clear.  The calvarium appears intact. Mild displaced nasal bone fracture with overlying soft tissue swelling..                                       Medications   LIDOcaine (PF) 10 mg/ml (1%) injection 50 mg (50 mg Infiltration Given 4/19/25 0448)   Tdap vaccine injection 0.5 mL (0.5 mLs Intramuscular Given 4/19/25  5928)   morphine injection 4 mg (4 mg Intravenous Given 4/19/25 0334)   ceFAZolin 2 g (2 g Intravenous Given 4/19/25 0757)   LIDOcaine (PF) 10 mg/ml (1%) injection 50 mg (50 mg Infiltration Given 4/19/25 6498)   HYDROcodone-acetaminophen 5-325 mg per tablet 1 tablet (1 tablet Oral Given 4/19/25 8347)     Medical Decision Making  49-year-old male presenting for syncopal episode with facial trauma.  Vitals were stable.  Exam with large laceration of the upper lip as well as laceration of the nose.  Otherwise at baseline.  Presentation is most consistent with vasovagal syncope.  His symptoms are such that he was trying to have a bowel movement and then after standing up, he passed out.  At no point was he complaining of any chest pain, shortness of breath, or palpitations.  His EKG showed normal sinus rhythm without acute ST segment or T-wave changes.  Troponin and BNP were within normal limits.  I doubt ACS or heart failure as the etiology of his syncope.  His chest x-ray shows no pneumonia, pneumothorax, or symptomatic pleural effusion.  CTs of the head, C-spine, and face show only nasal bone fracture which appears to be comminuted in nature.  There does appear to be some air around the broken bones of the nose.  The laceration over the nose does not appear to track to bone, so I do not believe that this fracture is open.  However, I will give a dose of Ancef here and place on Keflex.  Lacerations were repaired as described above.  Patient is overall well-appearing and tolerating p.o..  Stable for discharge.  Return precautions given.  Instructed to follow up with PCP for further evaluation.    Amount and/or Complexity of Data Reviewed  Labs: ordered.  Radiology: ordered.    Risk  OTC drugs.  Prescription drug management.               ED Course as of 04/19/25 0639   Sat Apr 19, 2025   0441 12 superficial sutures in the upper lip with 3 buried sutures [TX]   0502 3 sutures in the nose [TX]      ED Course User  Index  [[place]] Yon Lewis MD                           Clinical Impression:  Final diagnoses:  [R55] Syncope  [M25.569] Knee pain  [R55] Vasovagal syncope (Primary)  [S01.21XA] Laceration of nose, initial encounter  [S01.511A] Complicated laceration of lip, initial encounter  [S02.2XXA] Closed fracture of nasal bone, initial encounter          ED Disposition Condition    Discharge Good          ED Prescriptions       Medication Sig Dispense Start Date End Date Auth. Provider    cephALEXin (KEFLEX) 500 MG capsule  (Status: Discontinued) Take 1 capsule (500 mg total) by mouth every 12 (twelve) hours. for 7 days 14 capsule 4/19/2025 4/19/2025 Yon Lewis MD    HYDROcodone-acetaminophen (NORCO) 5-325 mg per tablet Take 1 tablet by mouth every 6 (six) hours as needed for Pain. 8 tablet 4/19/2025 4/21/2025 Yon Lewis MD    cephALEXin (KEFLEX) 500 MG capsule Take 1 capsule (500 mg total) by mouth every 12 (twelve) hours. for 7 days 14 capsule 4/19/2025 4/26/2025 Yon Lewis MD    oxymetazoline (AFRIN) 0.05 % nasal spray 1 spray by Nasal route 2 (two) times daily. for 3 days 15 mL 4/19/2025 4/22/2025 Yon Lewis MD    sodium chloride (SALINE NASAL) 0.65 % nasal spray 1 spray by Nasal route as needed for Congestion. 50 mL 4/19/2025 -- Yon Lewis MD          Follow-up Information       Follow up With Specialties Details Why Contact Info Additional Information    Adriel Garcia MD Otolaryngology Call on 4/21/2025  [address] [facility] EAR, NOSE AND THROAT  [facility] [address]  [phone]       [facility] - Emergency Dept Emergency Medicine Schedule an appointment as soon as possible for a visit   [address] [facility] [address] [phone] 1st floor                 [1]   Social History  Tobacco Use    Smoking status: Never    Smokeless tobacco: Never   Substance Use Topics    Alcohol use: Yes      Alcohol/week: 1.0 - 2.0 standard drink of alcohol     Types: 1 - 2 Shots of liquor per week    Drug use: Never        Yon Lewis MD  04/19/25 0620

## 2025-04-19 NOTE — DISCHARGE INSTRUCTIONS
Please return to the ED if you have new or worsening symptoms. Follow-up with ENT for further evaluation of nasal bone fracture.

## 2025-04-20 LAB
OHS QRS DURATION: 100 MS
OHS QTC CALCULATION: 444 MS

## 2025-04-21 ENCOUNTER — OFFICE VISIT (OUTPATIENT)
Dept: FAMILY MEDICINE | Facility: CLINIC | Age: 50
End: 2025-04-21
Payer: COMMERCIAL

## 2025-04-21 ENCOUNTER — TELEPHONE (OUTPATIENT)
Dept: FAMILY MEDICINE | Facility: CLINIC | Age: 50
End: 2025-04-21
Payer: COMMERCIAL

## 2025-04-21 VITALS
HEART RATE: 79 BPM | SYSTOLIC BLOOD PRESSURE: 110 MMHG | WEIGHT: 308 LBS | OXYGEN SATURATION: 97 % | BODY MASS INDEX: 38.5 KG/M2 | DIASTOLIC BLOOD PRESSURE: 70 MMHG

## 2025-04-21 DIAGNOSIS — S02.2XXA CLOSED FRACTURE OF NASAL BONE, INITIAL ENCOUNTER: Primary | ICD-10-CM

## 2025-04-21 DIAGNOSIS — M54.2 CERVICALGIA: ICD-10-CM

## 2025-04-21 DIAGNOSIS — S01.511D LIP LACERATION, SUBSEQUENT ENCOUNTER: ICD-10-CM

## 2025-04-21 DIAGNOSIS — S06.0XAD CONCUSSION WITH UNKNOWN LOSS OF CONSCIOUSNESS STATUS, SUBSEQUENT ENCOUNTER: ICD-10-CM

## 2025-04-21 PROCEDURE — 3078F DIAST BP <80 MM HG: CPT | Mod: CPTII,S$GLB,,

## 2025-04-21 PROCEDURE — 99999 PR PBB SHADOW E&M-EST. PATIENT-LVL IV: CPT | Mod: PBBFAC,,,

## 2025-04-21 PROCEDURE — 3074F SYST BP LT 130 MM HG: CPT | Mod: CPTII,S$GLB,,

## 2025-04-21 PROCEDURE — 4010F ACE/ARB THERAPY RXD/TAKEN: CPT | Mod: CPTII,S$GLB,,

## 2025-04-21 PROCEDURE — 3008F BODY MASS INDEX DOCD: CPT | Mod: CPTII,S$GLB,,

## 2025-04-21 PROCEDURE — 1160F RVW MEDS BY RX/DR IN RCRD: CPT | Mod: CPTII,S$GLB,,

## 2025-04-21 PROCEDURE — 99214 OFFICE O/P EST MOD 30 MIN: CPT | Mod: S$GLB,,,

## 2025-04-21 PROCEDURE — 1159F MED LIST DOCD IN RCRD: CPT | Mod: CPTII,S$GLB,,

## 2025-04-21 RX ORDER — METHOCARBAMOL 750 MG/1
750 TABLET, FILM COATED ORAL 4 TIMES DAILY
Qty: 40 TABLET | Refills: 0 | Status: SHIPPED | OUTPATIENT
Start: 2025-04-21 | End: 2025-05-01

## 2025-04-21 RX ORDER — HYDROCODONE BITARTRATE AND ACETAMINOPHEN 10; 325 MG/1; MG/1
1 TABLET ORAL EVERY 6 HOURS PRN
Qty: 28 TABLET | Refills: 0 | Status: SHIPPED | OUTPATIENT
Start: 2025-04-21

## 2025-04-21 NOTE — PROGRESS NOTES
SUBJECTIVE:      Patient ID: Joo Roman is a 49 y.o. male.    Chief Complaint: ER follow up and Dizziness (Thinks he may have a concussion after falling. Says he is dizzy and it is hard to move his head. He is in intense pain and wants to see about pain management)    History of Present Illness    CHIEF COMPLAINT:  Joo presents today for follow up after an ER visit for a fall with facial trauma    HISTORY OF PRESENT ILLNESS:  He reports waking up with severe abdominal pain and going to the bathroom. While there, he developed cold sweats and attempted to return to bed. He has no memory between getting off the toilet and reaching the sink, where he found himself covered in blood. He denies excessive straining during the attempted bowel movement. He believes he lost consciousness and struck his face on the bathroom floor. Duration of unconsciousness is unknown.    CURRENT SYMPTOMS:  He experiences dizziness with head turning, balance issues, and reports feeling like being 'on anesthesia' since the incident. He has neck pain, particularly shooting pain with certain movements. Pain significantly worsens when medication wears off. He has been using cold packs on the bridge of his nose and back of neck for relief. He denies vomiting or other severe concussion symptoms.    INJURIES:  He sustained multiple facial injuries including a broken nose, split lip requiring 15 stitches, and jaw misalignment affecting his bite.    Reviewed ER notes, Labs, and images.  He has followed up with a dentist this morning to adjust his jaw and teeth. He is schedule to follow up with ENT tomorrow.        Dizziness: no ear pain, no fever, no headaches, no nausea, no vomiting, no weakness, no palpitations and no chest pain.     Review of Systems   Constitutional:  Positive for activity change. Negative for appetite change, chills, fatigue, fever and unexpected weight change.   HENT:  Negative for congestion, ear pain and sore throat.     Eyes:  Negative for photophobia, pain and visual disturbance.   Respiratory:  Negative for cough, shortness of breath and wheezing.    Cardiovascular:  Negative for chest pain, palpitations and leg swelling.   Gastrointestinal:  Negative for abdominal pain, constipation, diarrhea, nausea and vomiting.   Endocrine: Negative for cold intolerance, heat intolerance and polydipsia.   Genitourinary:  Negative for difficulty urinating, dysuria and hematuria.   Musculoskeletal:  Positive for arthralgias and neck pain. Negative for myalgias.   Skin:  Positive for wound. Negative for rash.   Neurological:  Positive for dizziness. Negative for syncope, weakness and headaches.   Hematological:  Negative for adenopathy. Does not bruise/bleed easily.   Psychiatric/Behavioral:  Negative for dysphoric mood. The patient is not nervous/anxious.        History reviewed. No pertinent past medical history.  Current Medications[1]  Review of patient's allergies indicates:  No Known Allergies   Past Surgical History:   Procedure Laterality Date    ADENOIDECTOMY  2008    BALLOON SINUPLASTY OF PARANASAL SINUS  2008    BICEPS TENDON REPAIR Right 2015    Dr Garcia    HERNIA REPAIR      x3, 2000, mesh repair 2006? & 2008    TENDON REPAIR Left 2011    ankle, Dr Brown    TONSILLECTOMY  2008     Social History     Socioeconomic History    Marital status:    Occupational History    Occupation:      Comment: [employer]   Tobacco Use    Smoking status: Never    Smokeless tobacco: Never   Substance and Sexual Activity    Alcohol use: Yes     Alcohol/week: 1.0 - 2.0 standard drink of alcohol     Types: 1 - 2 Shots of liquor per week    Drug use: Never    Sexual activity: Yes     Partners: Female     Birth control/protection: None     Social Drivers of Health     Financial Resource Strain: Low Risk  (2/14/2025)    Overall Financial Resource Strain (CARDIA)     Difficulty of Paying Living Expenses: Not hard at all    Food Insecurity: No Food Insecurity (2/14/2025)    Hunger Vital Sign     Worried About Running Out of Food in the Last Year: Never true     Ran Out of Food in the Last Year: Never true   Transportation Needs: No Transportation Needs (2/14/2025)    PRAPARE - Transportation     Lack of Transportation (Medical): No     Lack of Transportation (Non-Medical): No   Physical Activity: Insufficiently Active (2/14/2025)    Exercise Vital Sign     Days of Exercise per Week: 2 days     Minutes of Exercise per Session: 20 min   Stress: No Stress Concern Present (2/14/2025)    [employer] - Occupational Stress Questionnaire     Feeling of Stress : Only a little   Housing Stability: Low Risk  (2/14/2025)    Housing Stability Vital Sign     Unable to Pay for Housing in the Last Year: No     Number of Times Moved in the Last Year: 0     Homeless in the Last Year: No     Family History   Problem Relation Name Age of Onset    Thyroid disease Mother      Heart disease Father            OBJECTIVE:      Vitals:    04/21/25 1505   BP: 110/70   BP Location: Left arm   Patient Position: Sitting   Pulse: 79   SpO2: 97%   Weight: (!) 139.7 kg (307 lb 15.7 oz)     Physical Exam  Vitals and nursing note reviewed.   Constitutional:       General: He is not in acute distress.     Appearance: Normal appearance. He is well-developed. He is obese. He is not ill-appearing or toxic-appearing.   HENT:      Head: Normocephalic and atraumatic.      Right Ear: Tympanic membrane, ear canal and external ear normal. There is no impacted cerumen.      Left Ear: Tympanic membrane, ear canal and external ear normal. There is no impacted cerumen.      Nose: Nose normal. No congestion or rhinorrhea.      Mouth/Throat:      Mouth: Mucous membranes are moist.      Pharynx: Oropharynx is clear. No oropharyngeal exudate or posterior oropharyngeal erythema.   Eyes:      General: No scleral icterus.        Right eye: No discharge.          Left eye: No discharge.      Extraocular Movements: Extraocular movements intact.      Conjunctiva/sclera: Conjunctivae normal.      Pupils: Pupils are equal, round, and reactive to light.   Neck:      Thyroid: No thyroid mass or thyromegaly.      Trachea: Trachea normal.   Cardiovascular:      Rate and Rhythm: Normal rate and regular rhythm.      Pulses: Normal pulses.      Heart sounds: Normal heart sounds. No murmur heard.  Pulmonary:      Effort: Pulmonary effort is normal. No respiratory distress.      Breath sounds: Normal breath sounds. No wheezing.   Musculoskeletal:         General: Tenderness and signs of injury present. Normal range of motion.      Cervical back: Normal range of motion and neck supple. Tenderness present.      Right lower leg: No edema.      Left lower leg: No edema.   Lymphadenopathy:      Cervical: No cervical adenopathy.   Skin:     General: Skin is warm and dry.      Coloration: Skin is not pale.      Findings: Bruising and erythema present. No rash.      Comments: Upper lip with swelling and bruising. Noted with sutures intact.  Laceration to nose with sutures intact.     Neurological:      Mental Status: He is alert and oriented to person, place, and time.   Psychiatric:         Mood and Affect: Mood normal.         Behavior: Behavior normal.         Thought Content: Thought content normal.         Judgment: Judgment normal.            Assessment:       1. Closed fracture of nasal bone, initial encounter    2. Lip laceration, subsequent encounter    3. Cervicalgia    4. Concussion with unknown loss of consciousness status, subsequent encounter        Plan:       Assessment & Plan    Assessed recent fall and associated injuries: broken nose, facial lacerations, and jaw misalignment.  Evaluated potential causes of syncope, considering vagal response as likely explanation given symptoms and negative cardiac workup.  Reviewed ER workup: normal troponins, BNP, and EKG.  Determined likely  concussion despite ER doctor's assessment.  Deferred immediate cardiology referral due to lack of specific cardiac symptoms and normal initial workup.  Discussed stress response effects on lab values, including glucose and WBC count.  Explained whiplash-like mechanism for neck pain and expected duration of symptoms.    PLAN SUMMARY:  - Started robaxin  (muscle relaxer) for neck pain and jaw tightness  - Increased Norco from 5 mg to 10 mg, every 6 hours as needed for pain  - Alternate with ibuprofen or Aleve every 6 hours forf needed  - Clean facial wounds with mild soap and water or baby wash  - Apply triple antibiotic ointment to wounds as needed  - Use cold packs on lips, bridge of nose, and back of neck for pain relief  - Eat soft foods and smoothies due to jaw misalignment  - No driving, especially when taking pain medication  - Limit screen time and exposure to bright lights  - Follow-up in 7-10 days for suture removal in nose and possibly lip    FACIAL INJURIES AND PAIN MANAGEMENT:  - Recommend using cold packs on lips, bridge of nose, and back of neck for pain relief.  - Joo to clean facial wounds with mild soap and water or baby wash.  - Recommend applying triple antibiotic ointment to wounds as needed.  - Joo to eat soft foods and smoothies due to jaw misalignment.  - Increased Norco from 5 mg to 10 mg, to be taken every 6 hours as needed for pain, with ibuprofen or Aleve every 6 hours for inflammation between Norco doses if needed.  - Started Afaxin (muscle relaxer) for neck pain and jaw tightness.    POST-CONCUSSION CARE:  - Joo should not drive, especially when taking pain medication.  - Recommend limiting screen time and exposure to bright lights.  - Joo to get plenty of rest.    FOLLOW-UP CARE:  - Follow up in 7-10 days for suture removal       Closed fracture of nasal bone, initial encounter  -     HYDROcodone-acetaminophen (NORCO)  mg per tablet; Take 1 tablet by mouth every 6 (six) hours  as needed for Pain.  Dispense: 28 tablet; Refill: 0    Lip laceration, subsequent encounter  -     HYDROcodone-acetaminophen (NORCO)  mg per tablet; Take 1 tablet by mouth every 6 (six) hours as needed for Pain.  Dispense: 28 tablet; Refill: 0    Cervicalgia  -     methocarbamoL (ROBAXIN) 750 MG Tab; Take 1 tablet (750 mg total) by mouth 4 (four) times daily. for 10 days  Dispense: 40 tablet; Refill: 0    Concussion with unknown loss of consciousness status, subsequent encounter  - Patient deferred referral to the concussion clinic  - limit// avoid screen time, exercising, alcohol  -avoid driving while symptoms are present  - Recommend taking off of work for the week to rest  - discuss importance of resting and gradually returning to activities    I spent a total of 30 minutes on the day of the visit.  This includes face to face time and non-face to face time preparing to see the patient (eg, review of tests), obtaining and/or reviewing separately obtained history, documenting clinical information in the electronic or other health record, independently interpreting results and communicating results to the patient/family/caregiver, or care coordinator.      No follow-ups on file.      4/21/2025 RICARDO Ballard, MAXINE  This note was generated with the assistance of ambient listening technology. Verbal consent was obtained by the patient and accompanying visitor(s) for the recording of patient appointment to facilitate this note. I attest to having reviewed and edited the generated note for accuracy, though some syntax or spelling errors may persist. Please contact the author of this note for any clarification.              [1]   Current Outpatient Medications   Medication Sig Dispense Refill    cephALEXin (KEFLEX) 500 MG capsule Take 1 capsule (500 mg total) by mouth every 12 (twelve) hours. for 7 days 14 capsule 0    dextroamphetamine-amphetamine (ADDERALL XR) 20 MG 24 hr capsule Take 1 capsule (20 mg  total) by mouth every morning. 30 capsule 0    lisinopriL-hydrochlorothiazide (PRINZIDE,ZESTORETIC) 10-12.5 mg per tablet TAKE 1 TABLET BY MOUTH EVERY DAY 90 tablet 1    oxymetazoline (AFRIN) 0.05 % nasal spray 1 spray by Nasal route 2 (two) times daily. for 3 days 15 mL 0    pantoprazole (PROTONIX) 40 MG tablet Take 1 tablet (40 mg total) by mouth once daily. 90 tablet 1    sodium chloride (SALINE NASAL) 0.65 % nasal spray 1 spray by Nasal route as needed for Congestion. 50 mL 12    traMADoL (ULTRAM) 50 mg tablet Take 1 tablet (50 mg total) by mouth every 6 (six) hours as needed for Pain. 12 each 0    ALPRAZolam (XANAX) 0.5 MG tablet Take 1 tablet (0.5 mg total) by mouth 2 (two) times daily as needed for Anxiety (Flight anxiety). 20 tablet 0    HYDROcodone-acetaminophen (NORCO)  mg per tablet Take 1 tablet by mouth every 6 (six) hours as needed for Pain. 28 tablet 0    methocarbamoL (ROBAXIN) 750 MG Tab Take 1 tablet (750 mg total) by mouth 4 (four) times daily. for 10 days 40 tablet 0    semaglutide, weight loss, (WEGOVY) 0.5 mg/0.5 mL PnIj Inject 0.5 mg into the skin every 7 days. 2 mL 3     No current facility-administered medications for this visit.

## 2025-04-21 NOTE — TELEPHONE ENCOUNTER
[employer] PA Approved      Prior Authorization Portal   Prior authorization approved  Payer: [employer] Patient's Payer Case ID: [id number]    [phone]  Note from payer: Your PA request has been approved. Additional information will be provided in the approval communication. (Message 1142)  Approval Details    Authorized from April 21, 2025 to May 21, 2025  Electronic appeal: Not supported  View History

## 2025-05-13 DIAGNOSIS — F90.9 ATTENTION DEFICIT HYPERACTIVITY DISORDER (ADHD), UNSPECIFIED ADHD TYPE: ICD-10-CM

## 2025-05-13 RX ORDER — DEXTROAMPHETAMINE SACCHARATE, AMPHETAMINE ASPARTATE MONOHYDRATE, DEXTROAMPHETAMINE SULFATE AND AMPHETAMINE SULFATE 5; 5; 5; 5 MG/1; MG/1; MG/1; MG/1
20 CAPSULE, EXTENDED RELEASE ORAL EVERY MORNING
Qty: 30 CAPSULE | Refills: 0 | Status: SHIPPED | OUTPATIENT
Start: 2025-05-13

## 2025-05-27 ENCOUNTER — OFFICE VISIT (OUTPATIENT)
Dept: FAMILY MEDICINE | Facility: CLINIC | Age: 50
End: 2025-05-27
Payer: COMMERCIAL

## 2025-05-27 VITALS
OXYGEN SATURATION: 95 % | BODY MASS INDEX: 39.17 KG/M2 | HEIGHT: 75 IN | TEMPERATURE: 99 F | DIASTOLIC BLOOD PRESSURE: 86 MMHG | HEART RATE: 86 BPM | SYSTOLIC BLOOD PRESSURE: 122 MMHG | WEIGHT: 315 LBS

## 2025-05-27 DIAGNOSIS — R53.83 FATIGUE, UNSPECIFIED TYPE: ICD-10-CM

## 2025-05-27 DIAGNOSIS — S02.2XXA CLOSED FRACTURE OF NASAL BONE, INITIAL ENCOUNTER: ICD-10-CM

## 2025-05-27 DIAGNOSIS — F90.9 ATTENTION DEFICIT HYPERACTIVITY DISORDER (ADHD), UNSPECIFIED ADHD TYPE: Primary | ICD-10-CM

## 2025-05-27 DIAGNOSIS — R73.9 HYPERGLYCEMIA: ICD-10-CM

## 2025-05-27 DIAGNOSIS — F07.81 POSTCONCUSSION SYNDROME: ICD-10-CM

## 2025-05-27 DIAGNOSIS — R55 SYNCOPE, UNSPECIFIED SYNCOPE TYPE: ICD-10-CM

## 2025-05-27 PROCEDURE — 99999 PR PBB SHADOW E&M-EST. PATIENT-LVL III: CPT | Mod: PBBFAC,,, | Performed by: NURSE PRACTITIONER

## 2025-05-27 RX ORDER — DEXTROAMPHETAMINE SACCHARATE, AMPHETAMINE ASPARTATE MONOHYDRATE, DEXTROAMPHETAMINE SULFATE AND AMPHETAMINE SULFATE 5; 5; 5; 5 MG/1; MG/1; MG/1; MG/1
20 CAPSULE, EXTENDED RELEASE ORAL EVERY MORNING
Qty: 30 CAPSULE | Refills: 0 | Status: SHIPPED | OUTPATIENT
Start: 2025-06-13

## 2025-05-27 RX ORDER — DEXTROAMPHETAMINE SACCHARATE, AMPHETAMINE ASPARTATE, DEXTROAMPHETAMINE SULFATE AND AMPHETAMINE SULFATE 7.5; 7.5; 7.5; 7.5 MG/1; MG/1; MG/1; MG/1
1 TABLET ORAL DAILY
COMMUNITY
End: 2025-05-27

## 2025-05-27 NOTE — PROGRESS NOTES
SUBJECTIVE:      Patient ID: Joo Roman is a 49 y.o. male.    Chief Complaint: Follow-up (3 mon f/u)    History of Present Illness    CHIEF COMPLAINT:  Mr. Roman presents for follow-up after a recent fall resulting in a concussion and nasal fracture, with ongoing symptoms of fatigue and cognitive difficulties.    HPI:  Mr. Roman fell approximately 2 months ago (around April 19th) resulting in a concussion and nasal fracture. He initially went to the ER via ambulance on the night of the incident, receiving pain medication and undergoing a CT, EKG, and labs. Mr. Roman reports severe facial pain post-incident. He was discharged from the ER without a heart monitor, which frustrated his neurologist who he saw at follow-up.    Since the incident, patient has been evaluated by multiple specialists including MAXINE Ballard for discharge follow-up, Dr. Marcelo Lucas (neurologist) for post-concussion syndrome, and  for neck pain. Dr. Moore performed x-rays showing muscular pain without skeletal issues. He as referred to a cardiologist in [place], but patient has had difficulty scheduling this appointment.    Mr. Roman is currently having persistent fatigue, with significant exhaustion from minimal exertion. He attributes some fatigue to breathing difficulties, as he can only breathe through half of his nose about 90% of the time due to the nasal fracture. An ENT specialist informed him that the nasal cartilage has been displaced to one side, and he may need surgery once the inflammation subsides. He has a follow-up visit with the ENT early next week.    The neurologist advised patient to pace himself when returning to work, suggesting that some exhaustion could be due to cognitive overexertion. Mr. Roman has been working half days and three-quarter days. He reports feeling frustrated with constant fatigue and notes cognitive symptoms, including forgetfulness and short-term memory issues, describing it as  persistent mental fog.    Mr. Roman continues to take Adderall XR 20mg for focus and concentration, though he did not take it today as it was in his wife's car. He reports no chest pain or palpitations with the medication. He typically sleeps for 5-6 hours before waking up. He occasionally uses Benadryl or half of his wife's sleep aid for assistance, though he dislikes the latter due to excessive drowsiness the following day.    Mr. Roman uses compression socks from [employer] for swelling in his legs, which he attributes to sitting for long periods at work. He notes a slight indentation on his legs when he removes the socks at home.    Mr. Roman denies any recent fall episodes since the initial incident or syncopal episodes.    MEDICAL HISTORY:  Mr. Roman has a history of post-concussion syndrome and nasal fracture, both occurring on April 19th.    TEST RESULTS:  Mr. Roman underwent labs on April 19th, and the results were normal. An EKG was performed on April 19th, with normal results.    IMAGING:  Mr. Roman had a CT on April 19th, which showed normal results. Neck X-rays were also performed, though the date was not specified. The X-rays revealed no skeletal issues, but muscular pain was identified.    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 Negative for activity change, appetite change, chills, diaphoresis, fever and unexpected weight change.   HENT:  Negative for congestion, ear pain, sinus pressure, sore throat, trouble swallowing and voice change.         Nasal bone fracture   Eyes:  Negative for pain, discharge and visual disturbance.   Respiratory:  Negative for cough, chest tightness, shortness of breath and wheezing.    Cardiovascular:  Negative for chest pain and palpitations.   Gastrointestinal:  Negative for abdominal pain, constipation,  diarrhea, nausea and vomiting.   Genitourinary:  Negative for difficulty urinating, flank pain, frequency and urgency.   Musculoskeletal:  Negative for back pain and joint swelling.   Skin:  Negative for color change and rash.   Neurological:  Negative for dizziness, seizures, syncope, weakness, numbness and headaches.        Postconcussion syndrome    Hematological:  Negative for adenopathy.   Psychiatric/Behavioral:  Negative for dysphoric mood and sleep disturbance. The patient is not nervous/anxious.        Family History   Problem Relation Name Age of Onset    Thyroid disease Mother      Heart disease Father        Social History     Socioeconomic History    Marital status:    Occupational History    Occupation:      Comment: [employer]   Tobacco Use    Smoking status: Never    Smokeless tobacco: Never   Substance and Sexual Activity    Alcohol use: Yes     Alcohol/week: 1.0 - 2.0 standard drink of alcohol     Types: 1 - 2 Shots of liquor per week    Drug use: Never    Sexual activity: Yes     Partners: Female     Birth control/protection: None     Social Drivers of Health     Financial Resource Strain: Low Risk  (2/14/2025)    Overall Financial Resource Strain (CARDIA)     Difficulty of Paying Living Expenses: Not hard at all   Food Insecurity: No Food Insecurity (2/14/2025)    Hunger Vital Sign     Worried About Running Out of Food in the Last Year: Never true     Ran Out of Food in the Last Year: Never true   Transportation Needs: No Transportation Needs (2/14/2025)    PRAPARE - Transportation     Lack of Transportation (Medical): No     Lack of Transportation (Non-Medical): No   Physical Activity: Insufficiently Active (2/14/2025)    Exercise Vital Sign     Days of Exercise per Week: 2 days     Minutes of Exercise per Session: 20 min   Stress: No Stress Concern Present (2/14/2025)    [employer] - Occupational Stress Questionnaire     Feeling of  "Stress : Only a little   Housing Stability: Low Risk  (2/14/2025)    Housing Stability Vital Sign     Unable to Pay for Housing in the Last Year: No     Number of Times Moved in the Last Year: 0     Homeless in the Last Year: No     Current Outpatient Medications   Medication Sig    ALPRAZolam (XANAX) 0.5 MG tablet Take 1 tablet (0.5 mg total) by mouth 2 (two) times daily as needed for Anxiety (Flight anxiety).    lisinopriL-hydrochlorothiazide (PRINZIDE,ZESTORETIC) 10-12.5 mg per tablet TAKE 1 TABLET BY MOUTH EVERY DAY    pantoprazole (PROTONIX) 40 MG tablet Take 1 tablet (40 mg total) by mouth once daily.    [START ON 6/13/2025] dextroamphetamine-amphetamine (ADDERALL XR) 20 MG 24 hr capsule Take 1 capsule (20 mg total) by mouth every morning.   Last reviewed on 5/27/2025  2:24 PM by Ciaran Connelly FNP-C    Review of patient's allergies indicates:  No Known Allergies   History reviewed. No pertinent past medical history.  Past Surgical History:   Procedure Laterality Date    ADENOIDECTOMY  2008    BALLOON SINUPLASTY OF PARANASAL SINUS  2008    BICEPS TENDON REPAIR Right 2015    Dr Garcia    HERNIA REPAIR      x3, 2000, mesh repair 2006? & 2008    TENDON REPAIR Left 2011    ankle, Dr Brown    TONSILLECTOMY  2008          OBJECTIVE:      Vitals:    05/27/25 1401   BP: 122/86   BP Location: Left arm   Patient Position: Sitting   Pulse: 86   Temp: 98.8 °F (37.1 °C)   TempSrc: Oral   SpO2: 95%   Weight: (!) 143.3 kg (315 lb 14.7 oz)   Height: 6' 3" (1.905 m)     Physical Exam  Vitals and nursing note reviewed.   Constitutional:       General: He is awake. He is not in acute distress.     Appearance: He is well-developed and well-groomed. He is obese. He is not ill-appearing, toxic-appearing or diaphoretic.   HENT:      Head: Normocephalic and atraumatic.      Nose: Nasal deformity, signs of injury and nasal tenderness present.   Eyes:      General: Lids are normal. Gaze aligned appropriately.      " Conjunctiva/sclera: Conjunctivae normal.      Right eye: Right conjunctiva is not injected.      Left eye: Left conjunctiva is not injected.      Pupils: Pupils are equal, round, and reactive to light.   Cardiovascular:      Rate and Rhythm: Normal rate and regular rhythm.      Pulses: Normal pulses.      Heart sounds: Normal heart sounds, S1 normal and S2 normal. No murmur heard.     No friction rub. No gallop.   Pulmonary:      Effort: Pulmonary effort is normal. No respiratory distress.      Breath sounds: Normal breath sounds. No stridor. No decreased breath sounds, wheezing, rhonchi or rales.   Chest:      Chest wall: No tenderness.   Musculoskeletal:      Cervical back: Neck supple.      Right lower leg: No edema.      Left lower leg: No edema.   Lymphadenopathy:      Cervical: No cervical adenopathy.   Skin:     General: Skin is warm and dry.      Capillary Refill: Capillary refill takes less than 2 seconds.      Findings: No erythema or rash.   Neurological:      Mental Status: He is alert and oriented to person, place, and time. Mental status is at baseline.   Psychiatric:         Attention and Perception: Attention normal.         Mood and Affect: Mood normal.         Speech: Speech normal.         Behavior: Behavior normal. Behavior is cooperative.         Thought Content: Thought content normal.         Judgment: Judgment normal.       Admission on 04/19/2025, Discharged on 04/19/2025   Component Date Value Ref Range Status    QRS Duration 04/19/2025 100  ms Final    [facility] QTC Calculation 04/19/2025 444  ms Final    Troponin High Sensitive 04/19/2025 <2.3  <=14.9 pg/mL Final    Troponin results differ between methods. Do not use   results between Troponin methods interchangeably.    Alkaline Phospatase levels above 400 U/L may   cause false positive results.    Access hsTnI should not be used for patients taking   Asfotase buddy (Strensiq).    BNP 04/19/2025 2  <=99 pg/mL Final    Values of less than 100  pg/ml are consistent with non-CHF populations.    Sodium 04/19/2025 138  136 - 145 mmol/L Final    Potassium 04/19/2025 3.6  3.5 - 5.1 mmol/L Final    Chloride 04/19/2025 102  95 - 110 mmol/L Final    CO2 04/19/2025 29  23 - 29 mmol/L Final    Glucose 04/19/2025 153 (H)  70 - 110 mg/dL Final    BUN 04/19/2025 15  6 - 20 mg/dL Final    Creatinine 04/19/2025 1.2  0.5 - 1.4 mg/dL Final    Calcium 04/19/2025 9.2  8.7 - 10.5 mg/dL Final    Protein Total 04/19/2025 6.9  6.0 - 8.4 gm/dL Final    Albumin 04/19/2025 4.1  3.5 - 5.2 g/dL Final    Bilirubin Total 04/19/2025 0.5  0.1 - 1.0 mg/dL Final    For infants and newborns, interpretation of results should be based   on gestational age, weight and in agreement with clinical   observations.    Premature Infant recommended reference ranges:   0-24 hours:  <8.0 mg/dL   24-48 hours: <12.0 mg/dL   3-5 days:    <15.0 mg/dL   6-29 days:   <15.0 mg/dL    ALP 04/19/2025 66  55 - 135 unit/L Final    AST 04/19/2025 12  10 - 40 unit/L Final    ALT 04/19/2025 17  10 - 44 unit/L Final    Anion Gap 04/19/2025 7 (L)  8 - 16 mmol/L Final    eGFR 04/19/2025 >60  >60 mL/min/1.73/m2 Final    WBC 04/19/2025 15.78 (H)  3.90 - 12.70 K/uL Final    RBC 04/19/2025 5.06  4.60 - 6.20 M/uL Final    Hgb 04/19/2025 15.6  14.0 - 18.0 gm/dL Final    Hct 04/19/2025 47.0  40.0 - 54.0 % Final    MCV 04/19/2025 93  82 - 98 fL Final    MCH 04/19/2025 30.8  27.0 - 31.0 pg Final    MCHC 04/19/2025 33.2  32.0 - 36.0 g/dL Final    RDW 04/19/2025 12.6  11.5 - 14.5 % Final    Platelet Count 04/19/2025 314  150 - 450 K/uL Final    MPV 04/19/2025 10.1  9.2 - 12.9 fL Final    Nucleated RBC 04/19/2025 0  <=0 /100 WBC Final    Neut % 04/19/2025 77.3 (H)  38 - 73 % Final    Lymph % 04/19/2025 15.2 (L)  18 - 48 % Final    Mono % 04/19/2025 4.9  4 - 15 % Final    Eos % 04/19/2025 1.4  0 - 8 % Final    Basophil % 04/19/2025 0.6  <=1.9 % Final    Imm Grans % 04/19/2025 0.6 (H)  0.0 - 0.5 % Final    Neut # 04/19/2025 12.2 (H)   1.8 - 7.7 K/uL Final    Lymph # 04/19/2025 2.40  1 - 4.8 K/uL Final    Mono # 04/19/2025 0.77  0.3 - 1 K/uL Final    Eos # 04/19/2025 0.22  <=0.5 K/uL Final    Baso # 04/19/2025 0.09  <=0.2 K/uL Final    Imm Grans # 04/19/2025 0.10 (H)  0.00 - 0.04 K/uL Final    Mild elevation in immature granulocytes is non specific and can be seen in a variety of conditions including stress response, acute inflammation, trauma and pregnancy. Correlation with other laboratory and clinical findings is essential.    PTT 04/19/2025 29.3  21.0 - 32.0 seconds Final    Refer to local heparin nomogram for intensity/dose specific therapeutic range.    PT 04/19/2025 10.6  9.0 - 12.5 seconds Final    INR 04/19/2025 0.9  0.8 - 1.2 Final    Coumadin Therapy:    2.0 - 3.0 for INR for all indicators except mechanical heart valves    and antiphospholipid syndromes which should use 2.5 - 3.5.    Alcohol, Serum 04/19/2025 <10  <10 mg/dL Final          Assessment:       1. Attention deficit hyperactivity disorder (ADHD), unspecified ADHD type    2. Syncope, unspecified syncope type    3. Closed fracture of nasal bone, initial encounter    4. Fatigue, unspecified type    5. Postconcussion syndrome    6. Hyperglycemia        Plan:       Assessment & Plan    PLAN SUMMARY:  - Order additional cholesterol and thyroid function testing  - Continue Adderall XR 20 mg as previously prescribed  - Continue use of compression stockings  - Gradual return to work with half days and 3-quarter days as needed  - Follow up with ENT early next week for further evaluation and possible surgical intervention  - Contact cardiologist's office for appointment scheduling  - Follow up with provider in 3 months    POSTCONCUSSIONAL SYNDROME:  - Reviewed recent emergency department visit for concussion.  - Mr. Roman reports fatigue and short-term memory issues.  - Evaluated neurologist's recommendation for gradual return to work, advising patient to pace himself with half days and  3-quarter days as needed.  - Follow-up with neurology if symptoms persist.     ADHD:  - Assessed current Adderall usage and its effects; continued Adderall 20 mg as previously prescribed.  -  reviewed.  - Medication refilled to be picked up in June.    FATIGUE:  - Could be due to postconcussion syndrome.  - Fatigue may also be associated with sleep apnea, which he had corrected previously with sinuplasty with T&A.   - Repeat labs pending, CBC and TSH has been stable.     NASAL FRACTURE:  - Mr. Roman reports left nasal obstruction approximately 90% of the time due to nasal fracture sustained during recent injury.  - ENT evaluation showed cartilage displacement to one side.  - Discussed ENT's recommendation for possible surgical intervention once inflammation subsides.  - Instructed patient to follow up with ENT early next week for further evaluation.    BREATHING ABNORMALITIES:  - Mr. Roman experiencing difficulty breathing through left nostril related to the nasal fracture and cartilage displacement.  - This breathing issue will be addressed during the upcoming ENT appointment, with possible surgical intervention to be considered after inflammation resolves.    LOCALIZED EDEMA:  - Mr. Roman reports swelling in legs and currently uses compression stockings.  - Lower extremity edema likely due to prolonged sitting.  - Advised to continue compression stockings and monitor swelling.    ## FOLLOW-UP AND ADDITIONAL TESTING:  - Scheduled follow-up visit in 3 months.  - Mr. Roman has pending cardiology follow-up as recommended by Dr. Saad Moore; advised to contact cardiologist's office again for appointment scheduling.  - Will consider referral if patient provides name of recommended cardiologist.  - Reviewed labs from hospital visit and ordered additional cholesterol and thyroid function testing.  - Mr. Roman instructed to contact office with any issues regarding cardiology appointment scheduling.        Attention deficit  hyperactivity disorder (ADHD), unspecified ADHD type  -     dextroamphetamine-amphetamine (ADDERALL XR) 20 MG 24 hr capsule; Take 1 capsule (20 mg total) by mouth every morning.  Dispense: 30 capsule; Refill: 0  -     TSH; Future; Expected date: 05/27/2025    Syncope, unspecified syncope type  -     CBC Auto Differential; Future; Expected date: 05/27/2025  -     Comprehensive Metabolic Panel; Future; Expected date: 05/27/2025  -     Lipid Panel; Future; Expected date: 05/27/2025    Closed fracture of nasal bone, initial encounter    Fatigue, unspecified type  -     CBC Auto Differential; Future; Expected date: 05/27/2025  -     Comprehensive Metabolic Panel; Future; Expected date: 05/27/2025  -     TSH; Future; Expected date: 05/27/2025  -     Vitamin B12; Future; Expected date: 05/27/2025    Postconcussion syndrome  -     CBC Auto Differential; Future; Expected date: 05/27/2025  -     Comprehensive Metabolic Panel; Future; Expected date: 05/27/2025    Hyperglycemia  -     Comprehensive Metabolic Panel; Future; Expected date: 05/27/2025  -     Hemoglobin A1C; Future; Expected date: 05/27/2025      I spent a total of 27 minutes on the day of the visit.This includes face to face time and non-face to face time preparing to see the patient (eg, review of tests), obtaining and/or reviewing separately obtained history, documenting clinical information in the electronic or other health record, independently interpreting results and communicating results to the patient/family/caregiver, or care coordinator.    Follow up in about 3 months (around 8/27/2025) for ADHD.          5/27/2025 RICARDO Ruby, BILLP    This note was generated with the assistance of ambient listening technology. Verbal consent was obtained by the patient and accompanying visitor(s) for the recording of patient appointment to facilitate this note. I attest to having reviewed and edited the generated note for accuracy, though some syntax or  spelling errors may persist. Please contact the author of this note for any clarification.

## 2025-05-30 DIAGNOSIS — K21.9 GASTROESOPHAGEAL REFLUX DISEASE, UNSPECIFIED WHETHER ESOPHAGITIS PRESENT: ICD-10-CM

## 2025-05-30 RX ORDER — PANTOPRAZOLE SODIUM 40 MG/1
40 TABLET, DELAYED RELEASE ORAL
Qty: 90 TABLET | Refills: 1 | Status: SHIPPED | OUTPATIENT
Start: 2025-05-30

## 2025-06-05 DIAGNOSIS — J32.4 CHRONIC PANSINUSITIS: Primary | ICD-10-CM

## 2025-06-10 ENCOUNTER — HOSPITAL ENCOUNTER (OUTPATIENT)
Dept: RADIOLOGY | Facility: HOSPITAL | Age: 50
Discharge: HOME | End: 2025-06-10
Attending: STUDENT IN AN ORGANIZED HEALTH CARE EDUCATION/TRAINING PROGRAM
Payer: COMMERCIAL

## 2025-06-10 DIAGNOSIS — J32.4 CHRONIC PANSINUSITIS: ICD-10-CM

## 2025-06-10 PROCEDURE — 70486 CT MAXILLOFACIAL W/O DYE: CPT | Mod: TC,PO

## 2025-06-10 PROCEDURE — 70486 CT MAXILLOFACIAL W/O DYE: CPT | Mod: 26,,, | Performed by: RADIOLOGY

## 2025-06-18 ENCOUNTER — RESULTS FOLLOW-UP (OUTPATIENT)
Dept: FAMILY MEDICINE | Facility: CLINIC | Age: 50
End: 2025-06-18

## 2025-06-18 ENCOUNTER — TELEPHONE (OUTPATIENT)
Dept: FAMILY MEDICINE | Facility: CLINIC | Age: 50
End: 2025-06-18
Payer: COMMERCIAL

## 2025-06-18 NOTE — TELEPHONE ENCOUNTER
----- Message from MAXINE Ram sent at 6/18/2025  9:14 AM CDT -----  Lab results show slightly elevated cholesterol, normal CBC, normal glucose, kidney and liver function, normal thyroid function, normal vitamin B12 and no diabetes or prediabetes  ----- Message -----  From: Princess Alcantar  Sent: 6/18/2025   7:13 AM CDT  To: TINO Brower

## 2025-06-26 DIAGNOSIS — F90.9 ATTENTION DEFICIT HYPERACTIVITY DISORDER (ADHD), UNSPECIFIED ADHD TYPE: ICD-10-CM

## 2025-06-26 RX ORDER — DEXTROAMPHETAMINE SACCHARATE, AMPHETAMINE ASPARTATE MONOHYDRATE, DEXTROAMPHETAMINE SULFATE AND AMPHETAMINE SULFATE 5; 5; 5; 5 MG/1; MG/1; MG/1; MG/1
20 CAPSULE, EXTENDED RELEASE ORAL EVERY MORNING
Qty: 30 CAPSULE | Refills: 0 | Status: SHIPPED | OUTPATIENT
Start: 2025-06-26

## 2025-08-12 DIAGNOSIS — F90.9 ATTENTION DEFICIT HYPERACTIVITY DISORDER (ADHD), UNSPECIFIED ADHD TYPE: ICD-10-CM

## 2025-08-13 RX ORDER — DEXTROAMPHETAMINE SACCHARATE, AMPHETAMINE ASPARTATE MONOHYDRATE, DEXTROAMPHETAMINE SULFATE AND AMPHETAMINE SULFATE 5; 5; 5; 5 MG/1; MG/1; MG/1; MG/1
20 CAPSULE, EXTENDED RELEASE ORAL EVERY MORNING
Qty: 30 CAPSULE | Refills: 0 | Status: SHIPPED | OUTPATIENT
Start: 2025-08-13

## 2025-08-27 DIAGNOSIS — I10 ESSENTIAL HYPERTENSION: ICD-10-CM

## 2025-08-27 RX ORDER — LISINOPRIL AND HYDROCHLOROTHIAZIDE 10; 12.5 MG/1; MG/1
1 TABLET ORAL
Qty: 90 TABLET | Refills: 1 | Status: SHIPPED | OUTPATIENT
Start: 2025-08-27